# Patient Record
Sex: FEMALE | Race: WHITE | Employment: FULL TIME | URBAN - METROPOLITAN AREA
[De-identification: names, ages, dates, MRNs, and addresses within clinical notes are randomized per-mention and may not be internally consistent; named-entity substitution may affect disease eponyms.]

---

## 2018-04-18 ENCOUNTER — HOSPITAL ENCOUNTER (EMERGENCY)
Age: 53
Discharge: HOME | End: 2018-04-18
Attending: STUDENT IN AN ORGANIZED HEALTH CARE EDUCATION/TRAINING PROGRAM
Payer: COMMERCIAL

## 2018-04-18 VITALS
RESPIRATION RATE: 18 BRPM | OXYGEN SATURATION: 96 % | WEIGHT: 164 LBS | HEIGHT: 68 IN | DIASTOLIC BLOOD PRESSURE: 90 MMHG | BODY MASS INDEX: 24.86 KG/M2 | TEMPERATURE: 97.8 F | SYSTOLIC BLOOD PRESSURE: 146 MMHG | HEART RATE: 83 BPM

## 2018-04-18 DIAGNOSIS — H81.399 PERIPHERAL VERTIGO, UNSPECIFIED LATERALITY: Primary | ICD-10-CM

## 2018-04-18 LAB
GLUCOSE BLD STRIP.AUTO-MCNC: 93 MG/DL (ref 65–100)
SERVICE CMNT-IMP: NORMAL

## 2018-04-18 PROCEDURE — 74011250636 HC RX REV CODE- 250/636: Performed by: STUDENT IN AN ORGANIZED HEALTH CARE EDUCATION/TRAINING PROGRAM

## 2018-04-18 PROCEDURE — 82962 GLUCOSE BLOOD TEST: CPT

## 2018-04-18 PROCEDURE — 99283 EMERGENCY DEPT VISIT LOW MDM: CPT

## 2018-04-18 PROCEDURE — 74011250637 HC RX REV CODE- 250/637: Performed by: STUDENT IN AN ORGANIZED HEALTH CARE EDUCATION/TRAINING PROGRAM

## 2018-04-18 RX ORDER — DIAZEPAM 2 MG/1
2 TABLET ORAL
Status: COMPLETED | OUTPATIENT
Start: 2018-04-18 | End: 2018-04-18

## 2018-04-18 RX ORDER — MECLIZINE HCL 12.5 MG 12.5 MG/1
25 TABLET ORAL
Status: COMPLETED | OUTPATIENT
Start: 2018-04-18 | End: 2018-04-18

## 2018-04-18 RX ORDER — MECLIZINE HYDROCHLORIDE 25 MG/1
25 TABLET ORAL
Qty: 18 TAB | Refills: 0 | Status: SHIPPED | OUTPATIENT
Start: 2018-04-18 | End: 2021-04-06

## 2018-04-18 RX ADMIN — MECLIZINE 25 MG: 12.5 TABLET ORAL at 09:53

## 2018-04-18 RX ADMIN — DIAZEPAM 2 MG: 2 TABLET ORAL at 09:53

## 2018-04-18 NOTE — DISCHARGE INSTRUCTIONS
Vertigo: Care Instructions  Your Care Instructions    Vertigo is the feeling that you or your surroundings are moving when there is no actual movement. It is often described as a feeling of spinning, whirling, falling, or tilting. Vertigo may make you vomit or feel nauseated. You may have trouble standing or walking and may lose your balance. Vertigo is often related to an inner ear problem, but it can have other more serious causes. If vertigo continues, you may need more tests to find its cause. Follow-up care is a key part of your treatment and safety. Be sure to make and go to all appointments, and call your doctor if you are having problems. It's also a good idea to know your test results and keep a list of the medicines you take. How can you care for yourself at home? · Do not lie flat on your back. Prop yourself up slightly. This may reduce the spinning feeling. Keep your eyes open. · Move slowly so that you do not fall. · If your doctor recommends medicine, take it exactly as directed. · Do not drive while you are having vertigo. Certain exercises, called Munguia-Daroff exercises, can help decrease vertigo. To do Munguia-Daroff exercises:  · Sit on the edge of a bed or sofa and quickly lie down on the side that causes the worst vertigo. Lie on your side with your ear down. · Stay in this position for at least 30 seconds or until the vertigo goes away. · Sit up. If this causes vertigo, wait for it to stop. · Repeat the procedure on the other side. · Repeat this 10 times. Do these exercises 2 times a day until the vertigo is gone. When should you call for help? Call 911 anytime you think you may need emergency care. For example, call if:  ? · You passed out (lost consciousness). ? · You have symptoms of a stroke. These may include:  ¨ Sudden numbness, tingling, weakness, or loss of movement in your face, arm, or leg, especially on only one side of your body.   ¨ Sudden vision changes. ¨ Sudden trouble speaking. ¨ Sudden confusion or trouble understanding simple statements. ¨ Sudden problems with walking or balance. ¨ A sudden, severe headache that is different from past headaches. ?Call your doctor now or seek immediate medical care if:  ? · Vertigo occurs with a fever, a headache, or ringing in your ears. ? · You have new or increased nausea and vomiting. ? Watch closely for changes in your health, and be sure to contact your doctor if:  ? · Vertigo gets worse or happens more often. ? · Vertigo has not gotten better after 2 weeks. Where can you learn more? Go to http://annmarie-lu.info/. Enter O688 in the search box to learn more about \"Vertigo: Care Instructions. \"  Current as of: May 12, 2017  Content Version: 11.4  © 0996-5987 [employer]. Care instructions adapted under license by [employer] (which disclaims liability or warranty for this information). If you have questions about a medical condition or this instruction, always ask your healthcare professional. [place] [address] any warranty or liability for your use of this information.

## 2018-04-18 NOTE — ED PROVIDER NOTES
HPI Comments: 50 y.o. female with past medical history significant for vertigo, anxiety, acid reflux, and seasonal allergies who presents from home via private vehicle with chief complaint of dizziness. Pt reports that two nights ago she had onset of dizziness when laying in bed. This resolved after ~ 20 minutes when she sat up. Last night, however, she had onset of similar sx that have not resolved. She notes dizziness that is exacerbated byany movement (e.g. turning her head, stepping, and blowing her nose). She describes the dizziness as not so much room spinning but her vision lagging behind as if she had been drinking alcohol and feeling \"offkilter. \" She notes associated nausea, vomiting, and lightheadedness. Pt denies problems with speech or vision. No tinnitus. Pt notes that she had similar sx three years ago that resolved after 20 minutes and she was dx with vertigo. Pt was told by MD that this may have been caused by fluid due to her seasonal allergies, and pt notes that she has been taking Claritin and Flonase without having a similar problem until onset 2 days ago. Pt does not have any medications at home specifically for vertigo and has not taken any PTA. Pt denies CP, abdominal pain, and SOB. There are no other acute medical concerns at this time. PCP: Marleny Zuluaga MD    Note written by Nayan Vicente, as dictated by Leon Gillis MD 9:35 AM     The history is provided by the patient. No  was used. Past Medical History:   Diagnosis Date    Acid reflux     Allergy     Psychiatric disorder     anxiety    Vertigo        History reviewed. No pertinent surgical history. History reviewed. No pertinent family history. Social History     Social History    Marital status: UNKNOWN     Spouse name: N/A    Number of children: N/A    Years of education: N/A     Occupational History    Not on file.      Social History Main Topics    Smoking status: Former Smoker     Packs/day: 0.50     Years: 5.00     Quit date: 5/11/2001    Smokeless tobacco: Never Used    Alcohol use Yes    Drug use: No    Sexual activity: Not on file     Other Topics Concern    Not on file     Social History Narrative         ALLERGIES: Erythromycin; Keflex [cephalexin]; and Sulfa (sulfonamide antibiotics)    Review of Systems   Constitutional: Negative for chills and fever. HENT: Negative for sore throat and tinnitus. Eyes: Negative for visual disturbance. Respiratory: Negative for cough and shortness of breath. Cardiovascular: Negative for chest pain. Gastrointestinal: Positive for nausea and vomiting. Negative for abdominal pain. Genitourinary: Negative for dysuria. Musculoskeletal: Negative for back pain. Skin: Negative for rash. Neurological: Positive for dizziness and light-headedness. Negative for syncope, speech difficulty and headaches. Psychiatric/Behavioral: Negative for confusion. All other systems reviewed and are negative. Vitals:    04/18/18 0848   BP: 146/90   Pulse: 83   Resp: 18   Temp: 97.8 °F (36.6 °C)   SpO2: 96%   Weight: 74.4 kg (164 lb)   Height: 5' 7.5\" (1.715 m)            Physical Exam   Constitutional: She is oriented to person, place, and time. She appears well-developed. No distress. HENT:   Head: Normocephalic and atraumatic. TM's normal bilaterally. Positive mild nasal congestion. Eyes: Conjunctivae and EOM are normal. Pupils are equal, round, and reactive to light. Neck: Normal range of motion. Neck supple. Cardiovascular: Normal rate, regular rhythm and normal heart sounds. No murmur heard. Pulmonary/Chest: Effort normal and breath sounds normal. No respiratory distress. Abdominal: Soft. Bowel sounds are normal. She exhibits no distension. There is no tenderness. There is no rebound. Musculoskeletal: Normal range of motion. She exhibits no edema.    Neurological: She is alert and oriented to person, place, and time. She has normal strength. No cranial nerve deficit or sensory deficit. She exhibits normal muscle tone. Coordination normal. GCS eye subscore is 4. GCS verbal subscore is 5. GCS motor subscore is 6. No focal neurological deficits. Bilateral finger to nose normal.    Skin: Skin is warm and dry. No rash noted. Psychiatric: She has a normal mood and affect. Her behavior is normal.   Nursing note and vitals reviewed.      Note written by Nayan Nolan, as dictated by Jennifer Sweet MD 9:40 AM      [facility]      ED Course       Procedures

## 2018-04-30 PROBLEM — I10 ESSENTIAL HYPERTENSION: Status: ACTIVE | Noted: 2018-04-30

## 2021-03-25 ENCOUNTER — TRANSCRIBE ORDER (OUTPATIENT)
Dept: REGISTRATION | Age: 56
End: 2021-03-25

## 2021-03-25 DIAGNOSIS — Z01.812 PRE-PROCEDURE LAB EXAM: Primary | ICD-10-CM

## 2021-04-03 ENCOUNTER — HOSPITAL ENCOUNTER (OUTPATIENT)
Dept: PREADMISSION TESTING | Age: 56
Discharge: HOME | End: 2021-04-03
Payer: COMMERCIAL

## 2021-04-03 DIAGNOSIS — Z01.812 PRE-PROCEDURE LAB EXAM: ICD-10-CM

## 2021-04-03 PROCEDURE — U0003 INFECTIOUS AGENT DETECTION BY NUCLEIC ACID (DNA OR RNA); SEVERE ACUTE RESPIRATORY SYNDROME CORONAVIRUS 2 (SARS-COV-2) (CORONAVIRUS DISEASE [COVID-19]), AMPLIFIED PROBE TECHNIQUE, MAKING USE OF HIGH THROUGHPUT TECHNOLOGIES AS DESCRIBED BY CMS-2020-01-R: HCPCS

## 2021-04-04 LAB — SARS-COV-2, COV2NT: NOT DETECTED

## 2021-04-06 ENCOUNTER — ANESTHESIA EVENT (OUTPATIENT)
Dept: SURGERY | Age: 56
End: 2021-04-06
Payer: COMMERCIAL

## 2021-04-06 NOTE — PERIOP NOTES
MAYUR DENNIS, GAVE PATIENT PREOP INSTRUCTIONS VERBALLY OVER THE PHONE PER ANESTHESIA PROTOCOL, PATIENT VERBALIZED UNDERSTANDING.

## 2021-04-06 NOTE — H&P
Gynecology History and Physical    Name: Jennifer Yanez MRN: [id number] SSN: [id number]    YOB: 1965  Age: 50 y.o. Sex: female       Subjective:      Chief complaint:  Postmenopausal bleeding    Viki Burgos is a 50 y.o. female with a history of postmenopausal bleeding. Ultrasound done in November showed normal uterus with EMT 1.2 mm. She also had complex left ovarian cyst but normal . She continued to have bleeding. Endometrial sampling was unable to be done due to stenotic os. She is admitted for Procedure(s) (LRB):  HYSTEROSCOPY, MYOSURE, ENDOMETRAIL SAMPLING (N/A). OB History    No obstetric history on file. Past Medical History:   Diagnosis Date    Acid reflux     Allergy     Asthma     STRESS INDUCED ASTHMA    Essential hypertension 2018    Psychiatric disorder     anxiety    Vertigo      Past Surgical History:   Procedure Laterality Date    HX COLONOSCOPY      HX WISDOM TEETH EXTRACTION       Social History     Occupational History    Not on file   Tobacco Use    Smoking status: Former Smoker     Packs/day: 0.50     Years: 5.00     Pack years: 2.50     Quit date: 2001     Years since quittin.9    Smokeless tobacco: Never Used   Substance and Sexual Activity    Alcohol use: Yes    Drug use: Yes     Types: Marijuana     Comment: LAST USE OF EDIBLES WAS 3/25/21    Sexual activity: Not on file     Family History   Problem Relation Age of Onset    Stroke Mother     Diabetes Mother     Lupus Mother     Heart Disease Father     Other Father         HISTORY OF AFIB    Hypertension Father     Hypertension Brother     Anesth Problems Neg Hx         Allergies   Allergen Reactions    Erythromycin Unknown (comments)    Keflex [Cephalexin] Unknown (comments)    Sulfa (Sulfonamide Antibiotics) Rash     Prior to Admission medications    Medication Sig Start Date End Date Taking?  Authorizing Provider   lisinopriL (PRINIVIL, ZESTRIL) 40 mg tablet Take 1 Tab by mouth daily. 11/30/20  Yes Wu Hernández MD   sertraline (ZOLOFT) 50 mg tablet TAKE 2 TABLETS BY MOUTH DAILY 11/2/20  Yes Wu Hernández MD   albuterol (PROVENTIL HFA) 90 mcg/actuation inhaler Take  by inhalation. Yes Provider, Historical        Review of Systems:  A comprehensive review of systems was negative except for that written in the History of Present Illness. Objective:     Vitals:    04/06/21 1014   Weight: 78.6 kg (173 lb 5.6 oz)   Height: 5' 7\" (1.702 m)       Physical Exam:  Patient without distress. Heart: Regular rate and rhythm  Lung: clear to auscultation throughout lung fields, no wheezes, no rales, no rhonchi and normal respiratory effort  Abdomen: soft, nontender    Assessment:     Active Problems:    * No active hospital problems. *    49 y/o with persistent postmenopausal bleeding. Reviewed presentation, history and previous records. Discussed secondary to cervical stenosis noted on previous examinations, recommend proceeding with surgical intervention for both diagnostic and therapeutic benefit. Discussed recommendation for Hysteroscopy, Myosure endometrial sampling. Reviewed risks, benefits, expectations of procedure including but not limited to risks of bleeding, infection, anesthesia, injury to surrounding organs. Discussed RX for misoprostol will be sent for patient to take the evening prior to procedure. Patient expressed understanding. Plan:     Procedure(s) (LRB):  HYSTEROSCOPY, MYOSURE, ENDOMETRAIL SAMPLING (N/A)  Discussed the risks of surgery including the risks of bleeding, infection, deep vein thrombosis, and surgical injuries to internal organs including but not limited to the bowels, bladder, rectum, and female reproductive organs. The patient understands the risks; any and all questions were answered to the patient's satisfaction.

## 2021-04-07 ENCOUNTER — ANESTHESIA (OUTPATIENT)
Dept: SURGERY | Age: 56
End: 2021-04-07
Payer: COMMERCIAL

## 2021-04-07 ENCOUNTER — HOSPITAL ENCOUNTER (OUTPATIENT)
Age: 56
Discharge: HOME | End: 2021-04-07
Attending: OBSTETRICS & GYNECOLOGY | Admitting: OBSTETRICS & GYNECOLOGY
Payer: COMMERCIAL

## 2021-04-07 VITALS
HEART RATE: 80 BPM | HEIGHT: 67 IN | SYSTOLIC BLOOD PRESSURE: 130 MMHG | TEMPERATURE: 98.3 F | BODY MASS INDEX: 27.21 KG/M2 | DIASTOLIC BLOOD PRESSURE: 76 MMHG | RESPIRATION RATE: 16 BRPM | WEIGHT: 173.35 LBS | OXYGEN SATURATION: 99 %

## 2021-04-07 DIAGNOSIS — Z98.890 S/P LAPAROSCOPY: Primary | ICD-10-CM

## 2021-04-07 PROBLEM — N95.0 POSTMENOPAUSAL BLEEDING: Status: ACTIVE | Noted: 2021-04-07

## 2021-04-07 LAB — HCG UR QL: NEGATIVE

## 2021-04-07 PROCEDURE — 74011000250 HC RX REV CODE- 250: Performed by: NURSE ANESTHETIST, CERTIFIED REGISTERED

## 2021-04-07 PROCEDURE — 77030020829: Performed by: OBSTETRICS & GYNECOLOGY

## 2021-04-07 PROCEDURE — 77030040361 HC SLV COMPR DVT MDII -B: Performed by: OBSTETRICS & GYNECOLOGY

## 2021-04-07 PROCEDURE — 88305 TISSUE EXAM BY PATHOLOGIST: CPT

## 2021-04-07 PROCEDURE — 77030010507 HC ADH SKN DERMBND J&J -B: Performed by: OBSTETRICS & GYNECOLOGY

## 2021-04-07 PROCEDURE — 74011250636 HC RX REV CODE- 250/636: Performed by: NURSE ANESTHETIST, CERTIFIED REGISTERED

## 2021-04-07 PROCEDURE — 77030008756 HC TU IRR SUC STRY -B: Performed by: OBSTETRICS & GYNECOLOGY

## 2021-04-07 PROCEDURE — 77030002933 HC SUT MCRYL J&J -A: Performed by: OBSTETRICS & GYNECOLOGY

## 2021-04-07 PROCEDURE — 81025 URINE PREGNANCY TEST: CPT

## 2021-04-07 PROCEDURE — 74011250636 HC RX REV CODE- 250/636: Performed by: ANESTHESIOLOGY

## 2021-04-07 PROCEDURE — 77030019908 HC STETH ESOPH SIMS -A: Performed by: ANESTHESIOLOGY

## 2021-04-07 PROCEDURE — 2709999900 HC NON-CHARGEABLE SUPPLY: Performed by: OBSTETRICS & GYNECOLOGY

## 2021-04-07 PROCEDURE — 77030031139 HC SUT VCRL2 J&J -A: Performed by: OBSTETRICS & GYNECOLOGY

## 2021-04-07 PROCEDURE — 77030041423 HC SYST FLUID MNGMT FLUENT HOLO -D: Performed by: OBSTETRICS & GYNECOLOGY

## 2021-04-07 PROCEDURE — 77030040922 HC BLNKT HYPOTHRM STRY -A

## 2021-04-07 PROCEDURE — 77030013484: Performed by: OBSTETRICS & GYNECOLOGY

## 2021-04-07 PROCEDURE — 77030008684 HC TU ET CUF COVD -B: Performed by: ANESTHESIOLOGY

## 2021-04-07 PROCEDURE — 77030026438 HC STYL ET INTUB CARD -A: Performed by: ANESTHESIOLOGY

## 2021-04-07 PROCEDURE — 77030012770 HC TRCR OPT FX AMR -B: Performed by: OBSTETRICS & GYNECOLOGY

## 2021-04-07 PROCEDURE — 74011250637 HC RX REV CODE- 250/637: Performed by: ANESTHESIOLOGY

## 2021-04-07 PROCEDURE — 76010000131 HC OR TIME 2 TO 2.5 HR: Performed by: OBSTETRICS & GYNECOLOGY

## 2021-04-07 PROCEDURE — 77030013079 HC BLNKT BAIR HGGR 3M -A: Performed by: ANESTHESIOLOGY

## 2021-04-07 PROCEDURE — 76210000006 HC OR PH I REC 0.5 TO 1 HR: Performed by: OBSTETRICS & GYNECOLOGY

## 2021-04-07 PROCEDURE — 77030019905 HC CATH URETH INTMIT MDII -A: Performed by: OBSTETRICS & GYNECOLOGY

## 2021-04-07 PROCEDURE — 77030020263 HC SOL INJ SOD CL0.9% LFCR 1000ML: Performed by: OBSTETRICS & GYNECOLOGY

## 2021-04-07 PROCEDURE — 76060000035 HC ANESTHESIA 2 TO 2.5 HR: Performed by: OBSTETRICS & GYNECOLOGY

## 2021-04-07 PROCEDURE — 77030040830 HC CATH URETH FOL MDII -A: Performed by: OBSTETRICS & GYNECOLOGY

## 2021-04-07 PROCEDURE — 76210000020 HC REC RM PH II FIRST 0.5 HR: Performed by: OBSTETRICS & GYNECOLOGY

## 2021-04-07 PROCEDURE — 74011000250 HC RX REV CODE- 250: Performed by: OBSTETRICS & GYNECOLOGY

## 2021-04-07 RX ORDER — SODIUM CHLORIDE, SODIUM LACTATE, POTASSIUM CHLORIDE, CALCIUM CHLORIDE 600; 310; 30; 20 MG/100ML; MG/100ML; MG/100ML; MG/100ML
INJECTION, SOLUTION INTRAVENOUS
Status: DISCONTINUED | OUTPATIENT
Start: 2021-04-07 | End: 2021-04-07 | Stop reason: HOSPADM

## 2021-04-07 RX ORDER — ONDANSETRON 2 MG/ML
4 INJECTION INTRAMUSCULAR; INTRAVENOUS AS NEEDED
Status: DISCONTINUED | OUTPATIENT
Start: 2021-04-07 | End: 2021-04-07 | Stop reason: HOSPADM

## 2021-04-07 RX ORDER — FENTANYL CITRATE 50 UG/ML
25 INJECTION, SOLUTION INTRAMUSCULAR; INTRAVENOUS
Status: DISCONTINUED | OUTPATIENT
Start: 2021-04-07 | End: 2021-04-07 | Stop reason: HOSPADM

## 2021-04-07 RX ORDER — HYDROMORPHONE HYDROCHLORIDE 2 MG/ML
INJECTION, SOLUTION INTRAMUSCULAR; INTRAVENOUS; SUBCUTANEOUS AS NEEDED
Status: DISCONTINUED | OUTPATIENT
Start: 2021-04-07 | End: 2021-04-07 | Stop reason: HOSPADM

## 2021-04-07 RX ORDER — SODIUM CHLORIDE 0.9 % (FLUSH) 0.9 %
5-40 SYRINGE (ML) INJECTION EVERY 8 HOURS
Status: DISCONTINUED | OUTPATIENT
Start: 2021-04-07 | End: 2021-04-07 | Stop reason: HOSPADM

## 2021-04-07 RX ORDER — OXYCODONE AND ACETAMINOPHEN 5; 325 MG/1; MG/1
1 TABLET ORAL AS NEEDED
Status: DISCONTINUED | OUTPATIENT
Start: 2021-04-07 | End: 2021-04-07 | Stop reason: HOSPADM

## 2021-04-07 RX ORDER — SODIUM CHLORIDE, SODIUM LACTATE, POTASSIUM CHLORIDE, CALCIUM CHLORIDE 600; 310; 30; 20 MG/100ML; MG/100ML; MG/100ML; MG/100ML
125 INJECTION, SOLUTION INTRAVENOUS CONTINUOUS
Status: DISCONTINUED | OUTPATIENT
Start: 2021-04-07 | End: 2021-04-07 | Stop reason: HOSPADM

## 2021-04-07 RX ORDER — SODIUM CHLORIDE 0.9 % (FLUSH) 0.9 %
5-40 SYRINGE (ML) INJECTION AS NEEDED
Status: DISCONTINUED | OUTPATIENT
Start: 2021-04-07 | End: 2021-04-07 | Stop reason: HOSPADM

## 2021-04-07 RX ORDER — DEXAMETHASONE SODIUM PHOSPHATE 4 MG/ML
INJECTION, SOLUTION INTRA-ARTICULAR; INTRALESIONAL; INTRAMUSCULAR; INTRAVENOUS; SOFT TISSUE AS NEEDED
Status: DISCONTINUED | OUTPATIENT
Start: 2021-04-07 | End: 2021-04-07 | Stop reason: HOSPADM

## 2021-04-07 RX ORDER — CEFAZOLIN SODIUM 1 G/3ML
INJECTION, POWDER, FOR SOLUTION INTRAMUSCULAR; INTRAVENOUS AS NEEDED
Status: DISCONTINUED | OUTPATIENT
Start: 2021-04-07 | End: 2021-04-07 | Stop reason: HOSPADM

## 2021-04-07 RX ORDER — SILVER NITRATE 38.21; 12.74 MG/1; MG/1
STICK TOPICAL AS NEEDED
Status: DISCONTINUED | OUTPATIENT
Start: 2021-04-07 | End: 2021-04-07 | Stop reason: HOSPADM

## 2021-04-07 RX ORDER — LIDOCAINE HYDROCHLORIDE 10 MG/ML
0.1 INJECTION, SOLUTION EPIDURAL; INFILTRATION; INTRACAUDAL; PERINEURAL AS NEEDED
Status: DISCONTINUED | OUTPATIENT
Start: 2021-04-07 | End: 2021-04-07 | Stop reason: HOSPADM

## 2021-04-07 RX ORDER — MIDAZOLAM HYDROCHLORIDE 1 MG/ML
INJECTION, SOLUTION INTRAMUSCULAR; INTRAVENOUS AS NEEDED
Status: DISCONTINUED | OUTPATIENT
Start: 2021-04-07 | End: 2021-04-07 | Stop reason: HOSPADM

## 2021-04-07 RX ORDER — OXYCODONE AND ACETAMINOPHEN 5; 325 MG/1; MG/1
1 TABLET ORAL
Qty: 20 TAB | Refills: 0 | Status: SHIPPED | OUTPATIENT
Start: 2021-04-07 | End: 2021-04-12

## 2021-04-07 RX ORDER — MIDAZOLAM HYDROCHLORIDE 1 MG/ML
1 INJECTION, SOLUTION INTRAMUSCULAR; INTRAVENOUS AS NEEDED
Status: DISCONTINUED | OUTPATIENT
Start: 2021-04-07 | End: 2021-04-07 | Stop reason: HOSPADM

## 2021-04-07 RX ORDER — FENTANYL CITRATE 50 UG/ML
INJECTION, SOLUTION INTRAMUSCULAR; INTRAVENOUS AS NEEDED
Status: DISCONTINUED | OUTPATIENT
Start: 2021-04-07 | End: 2021-04-07 | Stop reason: HOSPADM

## 2021-04-07 RX ORDER — ROCURONIUM BROMIDE 10 MG/ML
INJECTION, SOLUTION INTRAVENOUS AS NEEDED
Status: DISCONTINUED | OUTPATIENT
Start: 2021-04-07 | End: 2021-04-07 | Stop reason: HOSPADM

## 2021-04-07 RX ORDER — ONDANSETRON 2 MG/ML
INJECTION INTRAMUSCULAR; INTRAVENOUS AS NEEDED
Status: DISCONTINUED | OUTPATIENT
Start: 2021-04-07 | End: 2021-04-07 | Stop reason: HOSPADM

## 2021-04-07 RX ORDER — PROPOFOL 10 MG/ML
INJECTION, EMULSION INTRAVENOUS AS NEEDED
Status: DISCONTINUED | OUTPATIENT
Start: 2021-04-07 | End: 2021-04-07 | Stop reason: HOSPADM

## 2021-04-07 RX ORDER — MIDAZOLAM HYDROCHLORIDE 1 MG/ML
0.5 INJECTION, SOLUTION INTRAMUSCULAR; INTRAVENOUS
Status: DISCONTINUED | OUTPATIENT
Start: 2021-04-07 | End: 2021-04-07 | Stop reason: HOSPADM

## 2021-04-07 RX ORDER — DIPHENHYDRAMINE HYDROCHLORIDE 50 MG/ML
12.5 INJECTION, SOLUTION INTRAMUSCULAR; INTRAVENOUS AS NEEDED
Status: DISCONTINUED | OUTPATIENT
Start: 2021-04-07 | End: 2021-04-07 | Stop reason: HOSPADM

## 2021-04-07 RX ORDER — FENTANYL CITRATE 50 UG/ML
50 INJECTION, SOLUTION INTRAMUSCULAR; INTRAVENOUS AS NEEDED
Status: DISCONTINUED | OUTPATIENT
Start: 2021-04-07 | End: 2021-04-07 | Stop reason: HOSPADM

## 2021-04-07 RX ORDER — KETAMINE HYDROCHLORIDE 10 MG/ML
INJECTION, SOLUTION INTRAMUSCULAR; INTRAVENOUS AS NEEDED
Status: DISCONTINUED | OUTPATIENT
Start: 2021-04-07 | End: 2021-04-07 | Stop reason: HOSPADM

## 2021-04-07 RX ORDER — SUCCINYLCHOLINE CHLORIDE 20 MG/ML
INJECTION INTRAMUSCULAR; INTRAVENOUS AS NEEDED
Status: DISCONTINUED | OUTPATIENT
Start: 2021-04-07 | End: 2021-04-07 | Stop reason: HOSPADM

## 2021-04-07 RX ORDER — MORPHINE SULFATE 2 MG/ML
2 INJECTION, SOLUTION INTRAMUSCULAR; INTRAVENOUS
Status: DISCONTINUED | OUTPATIENT
Start: 2021-04-07 | End: 2021-04-07 | Stop reason: HOSPADM

## 2021-04-07 RX ORDER — GLYCOPYRROLATE 0.2 MG/ML
INJECTION INTRAMUSCULAR; INTRAVENOUS AS NEEDED
Status: DISCONTINUED | OUTPATIENT
Start: 2021-04-07 | End: 2021-04-07 | Stop reason: HOSPADM

## 2021-04-07 RX ORDER — ACETAMINOPHEN 325 MG/1
650 TABLET ORAL ONCE
Status: COMPLETED | OUTPATIENT
Start: 2021-04-07 | End: 2021-04-07

## 2021-04-07 RX ORDER — HYDROMORPHONE HYDROCHLORIDE 1 MG/ML
0.2 INJECTION, SOLUTION INTRAMUSCULAR; INTRAVENOUS; SUBCUTANEOUS
Status: DISCONTINUED | OUTPATIENT
Start: 2021-04-07 | End: 2021-04-07 | Stop reason: HOSPADM

## 2021-04-07 RX ORDER — IBUPROFEN 800 MG/1
800 TABLET ORAL
Qty: 30 TAB | Refills: 0 | Status: SHIPPED | OUTPATIENT
Start: 2021-04-07

## 2021-04-07 RX ORDER — LIDOCAINE HYDROCHLORIDE 20 MG/ML
INJECTION, SOLUTION EPIDURAL; INFILTRATION; INTRACAUDAL; PERINEURAL AS NEEDED
Status: DISCONTINUED | OUTPATIENT
Start: 2021-04-07 | End: 2021-04-07 | Stop reason: HOSPADM

## 2021-04-07 RX ORDER — NEOSTIGMINE METHYLSULFATE 1 MG/ML
INJECTION INTRAVENOUS AS NEEDED
Status: DISCONTINUED | OUTPATIENT
Start: 2021-04-07 | End: 2021-04-07 | Stop reason: HOSPADM

## 2021-04-07 RX ORDER — SODIUM CHLORIDE 9 MG/ML
25 INJECTION, SOLUTION INTRAVENOUS CONTINUOUS
Status: DISCONTINUED | OUTPATIENT
Start: 2021-04-07 | End: 2021-04-07 | Stop reason: HOSPADM

## 2021-04-07 RX ORDER — BUPIVACAINE HYDROCHLORIDE 2.5 MG/ML
INJECTION, SOLUTION EPIDURAL; INFILTRATION; INTRACAUDAL AS NEEDED
Status: DISCONTINUED | OUTPATIENT
Start: 2021-04-07 | End: 2021-04-07 | Stop reason: HOSPADM

## 2021-04-07 RX ADMIN — HYDROMORPHONE HYDROCHLORIDE 0.25 MG: 2 INJECTION, SOLUTION INTRAMUSCULAR; INTRAVENOUS; SUBCUTANEOUS at 11:16

## 2021-04-07 RX ADMIN — PROPOFOL 25 MG: 10 INJECTION, EMULSION INTRAVENOUS at 10:22

## 2021-04-07 RX ADMIN — ACETAMINOPHEN 650 MG: 325 TABLET ORAL at 08:33

## 2021-04-07 RX ADMIN — GLYCOPYRROLATE 0.4 MG: 0.2 INJECTION, SOLUTION INTRAMUSCULAR; INTRAVENOUS at 11:02

## 2021-04-07 RX ADMIN — FENTANYL CITRATE 50 MCG: 50 INJECTION, SOLUTION INTRAMUSCULAR; INTRAVENOUS at 09:36

## 2021-04-07 RX ADMIN — SODIUM CHLORIDE, POTASSIUM CHLORIDE, SODIUM LACTATE AND CALCIUM CHLORIDE 125 ML/HR: 600; 310; 30; 20 INJECTION, SOLUTION INTRAVENOUS at 08:48

## 2021-04-07 RX ADMIN — MIDAZOLAM HYDROCHLORIDE 2 MG: 1 INJECTION, SOLUTION INTRAMUSCULAR; INTRAVENOUS at 09:30

## 2021-04-07 RX ADMIN — ONDANSETRON HYDROCHLORIDE 4 MG: 2 INJECTION, SOLUTION INTRAMUSCULAR; INTRAVENOUS at 09:42

## 2021-04-07 RX ADMIN — PROPOFOL 200 MG: 10 INJECTION, EMULSION INTRAVENOUS at 09:36

## 2021-04-07 RX ADMIN — SODIUM CHLORIDE, POTASSIUM CHLORIDE, SODIUM LACTATE AND CALCIUM CHLORIDE: 600; 310; 30; 20 INJECTION, SOLUTION INTRAVENOUS at 09:23

## 2021-04-07 RX ADMIN — MIDAZOLAM HYDROCHLORIDE 3 MG: 1 INJECTION, SOLUTION INTRAMUSCULAR; INTRAVENOUS at 09:27

## 2021-04-07 RX ADMIN — KETAMINE HYDROCHLORIDE 25 MG: 10 INJECTION, SOLUTION INTRAMUSCULAR; INTRAVENOUS at 10:22

## 2021-04-07 RX ADMIN — ROCURONIUM BROMIDE 25 MG: 10 SOLUTION INTRAVENOUS at 10:25

## 2021-04-07 RX ADMIN — SUCCINYLCHOLINE CHLORIDE 160 MG: 20 INJECTION, SOLUTION INTRAMUSCULAR; INTRAVENOUS at 10:22

## 2021-04-07 RX ADMIN — PROPOFOL 200 MG: 10 INJECTION, EMULSION INTRAVENOUS at 09:35

## 2021-04-07 RX ADMIN — FENTANYL CITRATE 50 MCG: 50 INJECTION, SOLUTION INTRAMUSCULAR; INTRAVENOUS at 09:52

## 2021-04-07 RX ADMIN — KETAMINE HYDROCHLORIDE 25 MG: 10 INJECTION, SOLUTION INTRAMUSCULAR; INTRAVENOUS at 09:36

## 2021-04-07 RX ADMIN — CEFAZOLIN 2 G: 330 INJECTION, POWDER, FOR SOLUTION INTRAMUSCULAR; INTRAVENOUS at 10:26

## 2021-04-07 RX ADMIN — LIDOCAINE HYDROCHLORIDE 80 MG: 20 INJECTION, SOLUTION EPIDURAL; INFILTRATION; INTRACAUDAL; PERINEURAL at 09:35

## 2021-04-07 RX ADMIN — DEXAMETHASONE SODIUM PHOSPHATE 8 MG: 4 INJECTION, SOLUTION INTRAMUSCULAR; INTRAVENOUS at 09:42

## 2021-04-07 RX ADMIN — NEOSTIGMINE METHYLSULFATE 3 MG: 1 INJECTION, SOLUTION INTRAVENOUS at 11:02

## 2021-04-07 NOTE — INTERVAL H&P NOTE
Update History & Physical 
 
The Patient's History and Physical was reviewed with the patient and I examined the patient. There was no change. The surgical site was confirmed by the patient and me. Plan:  The risk, benefits, expected outcome, and alternative to the recommended procedure have been discussed with the patient. Patient understands and wants to proceed with the procedure. To OR for hysteroscopy, myosure endometrial sampling due to postmenopausal bleeding.   
 
Electronically signed by Frederic Layne MD on 4/7/2021 at 9:23 AM

## 2021-04-07 NOTE — DISCHARGE INSTRUCTIONS
Patient Education        Operative Hysteroscopy: What to Expect at Home  Your Recovery     An operative hysteroscopy is a procedure to find and treat problems with your uterus. It may have been done to remove growths from the uterus. Or it may have been done to treat fertility problems or abnormal bleeding. You may have cramps and vaginal bleeding for several days. If the doctor filled your uterus with air during the procedure, you may have gas pains, a feeling of fullness in your belly, or shoulder pain. These symptoms usually go away in 1 or 2 days. If the doctor filled your uterus with liquid during the procedure, you may have watery vaginal discharge for a few days. Many women are able to return to work on the day after the procedure. But it depends on what was done during the procedure and the type of work you do. This care sheet gives you a general idea about how long it will take for you to recover. But each person recovers at a different pace. Follow the steps below to get better as quickly as possible. How can you care for yourself at home? Activity    · Rest when you feel tired. Getting enough sleep will help you recover.     · Most women are able to return to work on the day after the procedure.     · You may shower and take baths as usual.     · Ask your doctor when it is okay for you to have sex.     · Talk about birth control with your doctor. Do not try to become pregnant until your doctor says it is okay. Diet    · You can eat your normal diet. If your stomach is upset, try bland, low-fat foods like plain rice, broiled chicken, toast, and yogurt.     · You may notice that your bowel movements are not regular right after the procedure. This is common. Try to avoid constipation and straining with bowel movements. You may want to take a fiber supplement every day. If you have not had a bowel movement after a couple of days, ask your doctor about taking a mild laxative.    Medicines    · Your doctor will tell you if and when you can restart your medicines. He or she will also give you instructions about taking any new medicines.     · If you take aspirin or some other blood thinner, ask your doctor if and when to start taking it again. Make sure that you understand exactly what your doctor wants you to do.     · Be safe with medicines. Take pain medicines exactly as directed. ? If the doctor gave you a prescription medicine for pain, take it as prescribed. ? If you are not taking a prescription pain medicine, ask your doctor if you can take an over-the-counter medicine. ? Do not take two or more pain medicines at the same time unless the doctor told you to. Many pain medicines have acetaminophen, which is Tylenol. Too much acetaminophen (Tylenol) can be harmful.     · If you think your pain medicine is making you sick to your stomach:  ? Take your medicine after meals (unless your doctor has told you not to). ? Ask your doctor for a different pain medicine.     · If your doctor prescribed antibiotics, take them as directed. Do not stop taking them just because you feel better. You need to take the full course of antibiotics. Other instructions    · You may have some light vaginal bleeding. Wear sanitary pads if needed. Do not douche or use tampons until your doctor says it is okay.     · You may want to use a heating pad on your belly to help with pain. Use a low heat setting. Follow-up care is a key part of your treatment and safety. Be sure to make and go to all appointments, and call your doctor if you are having problems. It's also a good idea to know your test results and keep a list of the medicines you take. When should you call for help? Call 911 anytime you think you may need emergency care. For example, call if:    · You pass out (lose consciousness).     · You have chest pain, are short of breath, or cough up blood.    Call your doctor now or seek immediate medical care if:    · You have bright red vaginal bleeding that soaks one or more pads in an hour, or you have large clots.     · You have vaginal discharge that has increased in amount or smells bad.     · You are sick to your stomach or cannot drink fluids.     · You have pain that does not get better after you take pain medicine.     · You have signs of infection, such as:  ? Increased pain, swelling, warmth, or redness. ? A fever.     · You cannot pass stools or gas.     · You have signs of a blood clot in your leg (called a deep vein thrombosis), such as:  ? Pain in your calf, back of the knee, thigh, or groin. ? Redness and swelling in your leg. Watch closely for changes in your health, and be sure to contact your doctor if you have any problems. Where can you learn more? Go to http://www.gray.com/  Enter S669 in the search box to learn more about \"Operative Hysteroscopy: What to Expect at Home. \"  Current as of: July 17, 2020               Content Version: 12.8  © 2006-2021 [employer]. Care instructions adapted under license by [employer] (which disclaims liability or warranty for this information). If you have questions about a medical condition or this instruction, always ask your healthcare professional. [place] [address] any warranty or liability for your use of this information. Patient Education     Patient Education        Vaginal Bleeding After Menopause: Care Instructions  Your Care Instructions     Vaginal bleeding after menopause can have many causes. Causes may include infection, inflammation, prescription hormones, abnormal growths, and injury. Your doctor may want you to have more tests to find the cause of your vaginal bleeding. Follow-up care is a key part of your treatment and safety. Be sure to make and go to all appointments, and call your doctor if you are having problems.  It's also a good idea to know your test results and keep a list of the medicines you take. How can you care for yourself at home? · If your doctor gave you medicine, take it exactly as prescribed. Call your doctor if you think you are having a problem with your medicine. · Do not have sex or put anything inside your vagina until you talk with your doctor. · Do not douche. When should you call for help? Call 911 anytime you think you may need emergency care. For example, call if:    · You passed out (lost consciousness). Call your doctor now or seek immediate medical care if:    · You have severe vaginal bleeding.     · You are dizzy or lightheaded, or you feel like you may faint.     · You have new or worse belly or pelvic pain. Watch closely for changes in your health, and be sure to contact your doctor if:    · Your bleeding gets worse.     · You think you might be pregnant.     · You do not get better as expected. Where can you learn more? Go to http://annmarie-lu.info/  Enter N304 in the search box to learn more about \"Vaginal Bleeding After Menopause: Care Instructions. \"  Current as of: July 17, 2020               Content Version: 12.8  © 9306-0392 [employer]. Care instructions adapted under license by [employer] (which disclaims liability or warranty for this information). If you have questions about a medical condition or this instruction, always ask your healthcare professional. [address] any warranty or liability for your use of this information. Pelvic Laparoscopy: What to Expect at [facility]     After surgery, it's normal to have a sore belly, cramping, or pain around the cuts the doctor made (incisions) for up to 4 days. You can expect to feel better and stronger each day. But you might get tired quickly and need pain medicine for a few days. Some people are able to return to work the day after surgery.  Others need to recover for a few days to a few weeks before going back to work. Sometimes pressure from the gas used during surgery causes other side effects. You may have pain in your neck or shoulders. Or you may feel pressure on your bladder and need to urinate more often than usual. These side effects should go away in less than 4 days. Do not lift anything heavy while you are recovering so that your incisions can heal.  This care sheet gives you a general idea about how long it will take for you to recover. But each person recovers at a different pace. Follow the steps below to get better as quickly as possible. How can you care for yourself at home? Activity    · Rest when you feel tired. Getting enough sleep will help you recover.     · Try to walk each day. Start out by walking a little more than you did the day before. Bit by bit, increase the amount you walk. Walking boosts blood flow and helps prevent pneumonia and constipation.     · For 1 week, avoid lifting anything that would make you strain. This may include a child, heavy grocery bags and milk containers, a heavy briefcase or backpack, cat litter or dog food bags, or a vacuum .     · Avoid strenuous activities, such as biking, jogging, weight lifting, and aerobic exercise, for 1 week.     · You may shower. Pat the incisions dry when you are done. Do not take a bath for the first week after surgery or until your doctor tells you it is okay.     · You may have some light vaginal bleeding. Wear sanitary pads if needed. Do not douche or use tampons.     · You may drive when you are no longer taking prescription pain medicine and can quickly move your foot from the gas pedal to the brake. You must also be able to sit comfortably for a long period of time, even if you do not plan to go far. You might get caught in traffic.     · You may need to take a few days to a few weeks off work.  It depends on the type of work you do and how you feel.     · Do not have sex until your doctor tells you it is okay.   Diet    · You can eat your normal diet. If your stomach is upset, try bland, low-fat foods like plain rice, broiled chicken, toast, and yogurt.     · Drink plenty of fluids (unless your doctor tells you not to).     · You may notice that your bowel movements are not regular right after your surgery. This is common. Try to avoid constipation and straining with bowel movements. You may want to take a fiber supplement every day. If you have not had a bowel movement after a couple of days, ask your doctor about taking a mild laxative. Medicines    · Your doctor will tell you if and when you can restart your medicines. He or she will also give you instructions about taking any new medicines.     · If you take aspirin or some other blood thinner, ask your doctor if and when to start taking it again. Make sure that you understand exactly what your doctor wants you to do.     · Be safe with medicines. Take pain medicines exactly as directed. ? If the doctor gave you a prescription medicine for pain, take it as prescribed. ? If you are not taking a prescription pain medicine, take an over-the-counter medicine such as acetaminophen (Tylenol), ibuprofen (Advil, Motrin), or naproxen (Aleve). Read and follow all instructions on the label. ? Do not take two or more pain medicines at the same time unless the doctor told you to. Many pain medicines contain acetaminophen, which is Tylenol. Too much acetaminophen (Tylenol) can be harmful.     · If you think your pain medicine is making you sick to your stomach:  ? Take your medicine after meals (unless your doctor tells you not to). ? Ask your doctor for a different pain medicine.     · If your doctor prescribed antibiotics, take them as directed. Do not stop taking them just because you feel better. You need to take the full course of antibiotics.    Incision care    · If you have strips of tape on the incisions, leave the tape on for a week or until it falls off.     · Wash the area daily with warm, soapy water and pat it dry.     · Keep the area clean and dry. You may cover it with a gauze bandage if it weeps or rubs against clothing. Change the bandage every day. Other instructions    · Wear loose, comfortable clothing, and avoid anything that puts pressure on your belly, such as a girdle, for a few weeks.     · You may want to use a heating pad on your belly to help with pain. Follow-up care is a key part of your treatment and safety. Be sure to make and go to all appointments, and call your doctor if you are having problems. It's also a good idea to know your test results and keep a list of the medicines you take. When should you call for help? Call 911 anytime you think you may need emergency care. For example, call if:    · You passed out (lost consciousness).     · You have chest pain, are short of breath, or cough up blood. Call your doctor now or seek immediate medical care if:    · You have bright red vaginal bleeding that soaks one or more pads in an hour, or you have large clots.     · You are sick to your stomach or cannot drink fluids.     · You have vaginal discharge that has increased in amount or smells bad.     · You have pain that does not get better after you take pain medicine.     · You have signs of infection, such as:  ? Increased pain, swelling, warmth, or redness. ? Red streaks leading from the incision. ? Pus draining from the incision. ? A fever.     · You have loose stitches, or your incisions come open.     · Bright red blood has soaked through the bandages over your incisions.     · You have signs of a blood clot in your leg (called a deep vein thrombosis), such as:  ? Pain in your calf, back of the knee, thigh, or groin. ? Redness and swelling in your leg.     · You cannot pass stools or gas. Watch closely for changes in your health, and be sure to contact your doctor if you have any problems. Where can you learn more?   Go to http://www.gray.com/  Enter X720 in the search box to learn more about \"Pelvic Laparoscopy: What to Expect at Home. \"  Current as of: July 17, 2020               Content Version: 12.8  © 2006-2021 [employer]. Care instructions adapted under license by [employer] (which disclaims liability or warranty for this information). If you have questions about a medical condition or this instruction, always ask your healthcare professional. [address] any warranty or liability for your use of this information. ______________________________________________________________________    Anesthesia Discharge Instructions    After general anesthesia or intervenous sedation, for 24 hours or while taking prescription Narcotics:  · Limit your activities  · Do not drive or operate hazardous machinery  · If you have not urinated within 8 hours after discharge, please contact your surgeon on call. · Do not make important personal or business decisions  · Do not drink alcoholic beverages    Report the following to your surgeon:  · Excessive pain, swelling, redness or odor of or around the surgical area  · Temperature over 100.5 degrees  · Nausea and vomiting lasting longer than 4 hours or if unable to take medication  · Any signs of decreased circulation or nerve impairment to extremity:  Change in color, persistent numbness, tingling, coldness or increased pain.   · Any questions

## 2021-04-07 NOTE — PERIOP NOTES
PHONE [address] [facility], [place] FROM PT'S , GEO, WITH DR. Mavis Greenberg AND THIS RN. PAPER CONSENT UPDATED.

## 2021-04-07 NOTE — ANESTHESIA PREPROCEDURE EVALUATION
Relevant Problems   CARDIOVASCULAR   (+) Essential hypertension       Anesthetic History   No history of anesthetic complications            Review of Systems / Medical History  Patient summary reviewed, nursing notes reviewed and pertinent labs reviewed    Pulmonary            Asthma : well controlled       Neuro/Psych   Within defined limits           Cardiovascular  Within defined limits  Hypertension              Exercise tolerance: >4 METS     GI/Hepatic/Renal  Within defined limits              Endo/Other  Within defined limits           Other Findings              Physical Exam    Airway  Mallampati: II  TM Distance: > 6 cm  Neck ROM: normal range of motion   Mouth opening: Normal     Cardiovascular  Regular rate and rhythm,  S1 and S2 normal,  no murmur, click, rub, or gallop             Dental  No notable dental hx       Pulmonary  Breath sounds clear to auscultation               Abdominal  GI exam deferred       Other Findings            Anesthetic Plan    ASA: 2  Anesthesia type: general          Induction: Intravenous  Anesthetic plan and risks discussed with: Patient

## 2021-04-07 NOTE — BRIEF OP NOTE
Brief Postoperative Note    Patient: Ken Wihte  YOB: 1965  MRN: [id number]    Date of Procedure: 4/7/2021     Pre-Op Diagnosis: POSTMENOPAUSAL BLEEDING, CERVICAL STENOSIS    Post-Op Diagnosis: Same     Procedure(s): HYSTEROSCOPY, DILITATION AND CURETTAGE, ENDOMETRIAL SAMPLING, DIAGNOSTIC LAPAROSCOPY    Surgeon(s):  Iza Cates MD    Surgical Assistant: Surg Asst-1: Burr Najjar    Anesthesia: General     Estimated Blood Loss (mL): 20 cc    Complications: uterine perforation    Specimens:   ID Type Source Tests Collected by Time Destination   1 : ENDOMETRIAL SAMPLING Fresh Uterus  Iza Cates MD 4/7/2021 1058 Pathology        Implants: * No implants in log *    Drains: * No LDAs found *    Findings:  Severely stenotic cervix but no lesions. Anteverted uterus approximately 7 week size on bimanual exam. Suspected uterine perforation so diagnostic laparoscopy performed. 1-2 mm posterior perforation noted upon laparoscopy but was hemostatic with pressure. Normal ovaries and fallopian tubes bilaterally.      Electronically Signed by Kerri Jimenez MD on 4/7/2021 at 11:11 AM

## 2021-04-07 NOTE — OP NOTES
Patient ID:     Name: Lew Noonan    Medical Record Number: [id number]    YOB: 1965    April 7, 2021      Preoperative Diagnosis:   POSTMENOPAUSAL BLEEDING, CERVICAL STENOSIS    Postoperative Diagnosis: POSTMENOPAUSAL BLEEDING, CERVICAL STENOSIS    Surgeon: Eneida Clancy MD    Assistant: Wayne Leach    Anesthesia: General, 0.25% marcaine    Procedure:  Hysteroscopy, Diagnostic laparoscopy    Estimated Blood Loss: 20 cc     Urine output: 300 cc total of clear yellow urine    Fluid deficit: 250 cc    IVF: 900 cc LR    Pathology /Specimens: endometrial biopsy    Complications: uterine perforation     Findings: Severely stenotic cervix but no lesions. Anteverted uterus approximately 6 week size on bimanual exam. Suspected uterine perforation so diagnostic laparoscopy performed. 1-2 mm posterior perforation noted upon laparoscopy but was hemostatic with pressure. Normal ovaries and fallopian tubes bilaterally. Signed By: Eneida Clancy MD        Procedure in Detail:  The patient was taken to the operating room where she was placed in the supine position. After undergoing adequate monitored anesthesia care she was placed up in the [facility] laparoscopy stirrups in the dorsal lithotomy position. The patient was then prepped and draped in the usual fashion straight cath was performed with return of 200 cc of clear yellow urine. Attention was first turned to the vagina where the speculum was placed in the vagina. The anterior lip of the cervix was grasped with a single-toothed tenaculum. Cervix was noted to be severely stenotic. Lacrimal duct probe, urethral dilators, cervical dilators were attempted but were unable to pass through external os. Cruciate incision was made on cervix. 3.75 mm hysteroscope was then used for hydrodistention. Under direct visualization it was difficult to determine if the cervical canal or false tract were visualized.  5mm scope was then attempted to be passed and fluid deficit was noted to increase to 250 quickly so procedure was aborted. Endometrial pipelle was then gently inserted into the cervix to assess cavity and at that time uterine perforation was suspected as uterus sounded to 10 cm. Patient was noted to be stable however given suspected perforation at that time, patient's  was called and consent to perform diagnostic laparoscopy was obtained. General endotracheal anesthesia was established and patient was then reprepped and draped. 2 g of ancef was administered. Pate was placed in the bladder. Spongestick was placed in the vagina. All other instruments were removed from the vagina and 's gloves were changed. Attention was then turned to the abdomen. 1mL of 0.25% marcaine was injected into the umbilicus. A vertical incision was made in the umbilicus and the 5mm 0 degree scope was placed into the 5 mm trochar. The skin was gripped bilaterally and elevated. The trochar was then advanced under direct visualization and intraperitoneal placement was confirmed. The abdomen was insulfflated without difficulty with CO2 gas. No evidence of bowel or other injury was noted. Small amount of pink tinged fluid was noted in the pelvis. Uterus was normal appearing along with normal ovaries and fallopian tubes bilaterally. Following this a 5mm port was placed without difficulty in the left lower quadrant after the area was infiltrated with marcaine to aid in visualization of posterior aspect of the uterus. A 1-2  mm perforation was noted posteriorly. It was slighly oozing so pressure was held for 1 minute. The site was then observed for 5 minutes with no brisk bleeding noted. Intraabdominal pressure was reduced and site was noted to still be hemostatic. Good hemostasis was noted and no blood loss was present. Attention was then turned to the vagina where the spongestick was removed.  Attempt was made again to obtain endometrial sampling with pipelle with direct visualization from above with laparoscope. The pipelle was unable to move past the cervix so procedure was aborted. The tenaculum was removed and silver nitrate was used to obtain hemostasis. The speculum was removed from the vagina and 's gloves again changed. After this the remaining peritoneal fluid was suctioned using the suction . After this the trocars were removed without difficulty and the abdomen was desufflated. The skin incisions were closed with 4-0 monocryl and dermabond was placed over them. The mora was also removed and noted to have 100 mL of clear urine. The patient was taken out of trendelenburg and extubated without difficulty and taken to the PACU in stable condition. Counts were correct x2.

## 2021-04-07 NOTE — ANESTHESIA POSTPROCEDURE EVALUATION
Post-Anesthesia Evaluation and Assessment    Patient: Richy Brady MRN: [id number]  SSN: [id number]    YOB: 1965  Age: 50 y.o. Sex: female      I have evaluated the patient and they are stable and ready for discharge from the PACU. Cardiovascular Function/Vital Signs  Visit Vitals  /76   Pulse 80   Temp 36.8 °C (98.3 °F)   Resp 16   Ht 5' 7\" (1.702 m)   Wt 78.6 kg (173 lb 5.6 oz)   SpO2 99%   BMI 27.15 kg/m²       Patient is status post General anesthesia for Procedure(s): HYSTEROSCOPY, DILITATION AND CURETTAGE, ENDOMETRIAL SAMPLING, DIAGNOSTIC LAPAROSCOPY. Nausea/Vomiting: None    Postoperative hydration reviewed and adequate. Pain:  Pain Scale 1: Numeric (0 - 10) (04/07/21 1232)  Pain Intensity 1: 0 (04/07/21 1232)   Managed    Neurological Status:   Neuro (WDL): Within Defined Limits (04/07/21 1200)  Neuro  Neurologic State: Alert (04/07/21 1200)  Orientation Level: Oriented X4 (04/07/21 1200)   At baseline    Mental Status, Level of Consciousness: Alert and  oriented to person, place, and time    Pulmonary Status:   O2 Device: Room air (04/07/21 1232)   Adequate oxygenation and airway patent    Complications related to anesthesia: None    Post-anesthesia assessment completed. No concerns    Signed By: Jamie Bermudez MD     April 7, 2021              Procedure(s): HYSTEROSCOPY, DILITATION AND CURETTAGE, ENDOMETRIAL SAMPLING, DIAGNOSTIC LAPAROSCOPY.     general    <BSHSIANPOST>    INITIAL Post-op Vital signs:   Vitals Value Taken Time   /79 04/07/21 1200   Temp 36.8 °C (98.3 °F) 04/07/21 1200   Pulse 77 04/07/21 1210   Resp 20 04/07/21 1210   SpO2 98 % 04/07/21 1210

## 2021-04-07 NOTE — ROUTINE PROCESS
Patient: Manjit Blanc MRN: [id number]  SSN: [id number] YOB: 1965  Age: 50 y.o. Sex: female Patient is status post Procedure(s): HYSTEROSCOPY, DILITATION AND CURETTAGE, ENDOMETRIAL SAMPLING, DIAGNOSTIC LAPAROSCOPY. Surgeon(s) and Role: Marcy Haywood MD - Primary Local/Dose/Irrigation:  14 ML 0.25% BUPIVACAINE Peripheral IV 04/07/21 Anterior;Proximal;Right Forearm (Active) Airway - Endotracheal Tube 04/07/21 Oral (Active) Dressing/Packing:  Incision 04/07/21 Perineum-Dressing/Treatment: Peripad (04/07/21 1102) Incision 04/07/21 Abdomen-Dressing/Treatment: Surgical glue (04/07/21 1102) Splint/Cast:  ] Other:

## 2021-05-04 ENCOUNTER — OFFICE VISIT (OUTPATIENT)
Dept: GYNECOLOGY | Age: 56
End: 2021-05-04
Payer: COMMERCIAL

## 2021-05-04 VITALS
BODY MASS INDEX: 27.4 KG/M2 | HEIGHT: 67 IN | WEIGHT: 174.6 LBS | SYSTOLIC BLOOD PRESSURE: 116 MMHG | DIASTOLIC BLOOD PRESSURE: 89 MMHG

## 2021-05-04 DIAGNOSIS — R93.89 THICKENED ENDOMETRIUM: ICD-10-CM

## 2021-05-04 DIAGNOSIS — N95.0 POSTMENOPAUSAL BLEEDING: Primary | ICD-10-CM

## 2021-05-04 PROCEDURE — 99204 OFFICE O/P NEW MOD 45 MIN: CPT | Performed by: OBSTETRICS & GYNECOLOGY

## 2021-05-04 NOTE — PROGRESS NOTES
New Patient, Referred by Dr. Freddy Crocker for PMB, D&C, endo sampling on 4/7/2021    1. Have you been to the ER, urgent care clinic since your last visit? Hospitalized since your last visit?  no    2. Have you seen or consulted any other health care providers outside of the [address] [place] [address] since your last visit? Include any pap smears or colon screening.    Yes, Dr. Freddy Crocker

## 2021-05-04 NOTE — LETTER
5/11/2021 Patient: Maine Lima YOB: 1965 Date of Visit: 5/4/2021 Jayesh Snider MD 
[address] [address] [address] [employer] Dante Graham MD 
[address] [address] [address] Fax: [phone] Dear MD Dante Bacon MD, Thank you for referring Ms. Valencia Hickman to [facility] for evaluation. My notes for this consultation are attached. If you have questions, please do not hesitate to call me. I look forward to following your patient along with you.  
 
 
Sincerely, 
 
Cory White MD

## 2021-05-11 PROBLEM — R93.89 THICKENED ENDOMETRIUM: Status: ACTIVE | Noted: 2021-05-11

## 2021-05-11 NOTE — PROGRESS NOTES
[address] [facility] [address] [place] [address]  P [phone]  F [phone]    Office Note  Patient ID:  Name:  Coni Stone  MRN:  [id number]  :  1965/55 y.o. Date:  2021      HISTORY OF PRESENT ILLNESS:  Ms. Coni Stone is a 50 y.o.  postmenopausal female who presents as a new patient from Dr. Torey Harrison for postmenopausal bleeding. The patient reports PMB that started in the fall of . She reports undergoing a pelvic ultrasound in 2020 that was noted to have a normal uterus and a thickened endometrial stripe 1.2mm, along with a complex left ovarian cyst. She was noted to have a normal Ca-125 at that time. Unable to perform EMB secondary to stenotic cervical os. On 2021 underwent attempted hysteroscopy with Dr. Torey Harrison, which was complicated by a uterine perforation. Diagnostic laparoscopy was normal, as well as noting normal tubes/ovaries. She was subsequently referred to our office for further management. Denies pelvic or abdominal pain/bloating. Still reports some vaginal spotting, but minimal amounts. Denies fevers, chills, change in appetite or bowel habits, CP, SOB, hematuria, hematochezia, or urinary symptoms. Pertinent PMH/PSH: HTN      Active, no restrictions. Imaging Review:   n/a    ROS:  A comprehensive review of systems was negative except for that written in the History of Present Illness.  , 10 point ROS    OB/GYN ROS:  Per HPI    ECOG ndGndrndanddndend:nd nd2nd Problem List:  Patient Active Problem List    Diagnosis Date Noted    Thickened endometrium 2021    Asthma     Allergy     Acid reflux     Postmenopausal bleeding 2021    Essential hypertension 2018     PMH:  Past Medical History:   Diagnosis Date    Acid reflux     Allergy     Asthma     STRESS INDUCED ASTHMA    Essential hypertension 2018    Psychiatric disorder     anxiety    Vertigo       PSH:  Past Surgical History:   Procedure Laterality Date    HX COLONOSCOPY      HX DILATION AND CURETTAGE      HX WISDOM TEETH EXTRACTION        Social History:  Social History     Tobacco Use    Smoking status: Former Smoker     Packs/day: 0.50     Years: 5.00     Pack years: 2.50     Quit date: 2001     Years since quittin.0    Smokeless tobacco: Never Used   Substance Use Topics    Alcohol use: Yes      Family History:  Family History   Problem Relation Age of Onset    Stroke Mother     Diabetes Mother     Lupus Mother     Heart Disease Father     Other Father         HISTORY OF AFIB    Hypertension Father     Hypertension Brother     Diabetes Maternal Grandmother     Heart Disease Paternal Grandfather     Cancer Paternal Aunt         Leukemia    Diabetes Maternal Aunt     Anesth Problems Neg Hx       Medications: (reviewed)  Current Outpatient Medications   Medication Sig    ibuprofen (MOTRIN) 800 mg tablet Take 1 Tab by mouth every eight (8) hours as needed for Pain.  lisinopriL (PRINIVIL, ZESTRIL) 40 mg tablet Take 1 Tab by mouth daily.  sertraline (ZOLOFT) 50 mg tablet TAKE 2 TABLETS BY MOUTH DAILY    albuterol (PROVENTIL HFA) 90 mcg/actuation inhaler Take  by inhalation. No current facility-administered medications for this visit. Allergies: (reviewed)  Allergies   Allergen Reactions    Bactrim Ds [Sulfamethoxazole-Trimethoprim] Diarrhea     mild    Erythromycin Unknown (comments)    Keflex [Cephalexin] Unknown (comments)    Sulfa (Sulfonamide Antibiotics) Rash        Gyn History:   Last pap: 2018, normal  History of abnormal pap: h/o ASCUS in       OBJECTIVE:    Physical Exam:  VITAL SIGNS: Vitals:    21 1404   BP: 116/89   Weight: 174 lb 9.6 oz (79.2 kg)   Height: 5' 7\" (1.702 m)     Body mass index is 27.35 kg/m². GENERAL JUANY: Conversant, alert, oriented. No acute distress. HEENT: HEENT. No thyroid enlargement. No JVD. Neck: Supple without restrictions.    RESPIRATORY: Clear to auscultation and percussion to the bases. No CVAT. CARDIOVASC: RRR without murmur/rub. GASTROINT: soft, non-tender, without masses or organomegaly   MUSCULOSKEL: no joint tenderness, deformity or swelling       EXTREMITIES: extremities normal, atraumatic, no cyanosis or edema   PELVIC: Exam chaperoned by nurse. Normal appearing external genitalia. On speculum exam, normal appearing vagina and cervix. On bimanual exam, the cervix and uterus are normal size and mobile. No evidence of adnexal masses or nodularity. RECTAL: deferred   LASHAUN SURVEY: No suspicious lymphadenopathy or edema noted. NEURO: Grossly intact. No acute deficit. Lab Date as available:    No results found for: WBC, HGB, HCT, PLT, MCV, HGBEXT, HCTEXT, PLTEXT, HGBEXT, HCTEXT, PLTEXT  Lab Results   Component Value Date/Time    Sodium 138 08/29/2013 02:41 PM    Potassium 3.8 08/29/2013 02:41 PM    Chloride 103 08/29/2013 02:41 PM    CO2 21 08/29/2013 02:41 PM    Glucose 72 08/29/2013 02:41 PM    BUN 7 08/29/2013 02:41 PM    Creatinine 0.64 08/29/2013 02:41 PM    BUN/Creatinine ratio 11 08/29/2013 02:41 PM    GFR est non- 08/29/2013 02:41 PM    Calcium 9.5 08/29/2013 02:41 PM         IMPRESSION/PLAN:    Ms. Gaston Menard is a 50 y.o. female with a working diagnosis of PMB and cervical stenosis. Problems:     Patient Active Problem List    Diagnosis Date Noted    Thickened endometrium 05/11/2021    Asthma     Allergy     Acid reflux     Postmenopausal bleeding 04/07/2021    Essential hypertension 04/30/2018       I reviewed Ms. Rita Bailon course to date, including her medical records, recent studies, physical exam, and review of symptoms. S/p attempted hysteroscopy/D&C on 4/7/2021 with Dr. Sylvia Rodríguez, which was complicated by uterine perforation requiring a diagnostic laparoscopy (which was normal). Counseled patient regarding PMB. Risk of malignancy is 10-20% overall.  Given her thickened endometrial stripe and PMB, I have recommended either a repeat attempt at hysteroscopy/D&C under ultra sound guidance versus a definitive hysterectomy. The patient would like to move forward with the hysterectomy, which is reasonable and appropriate. Counseled patient regarding benign, pre-malignant, and malignant conditions of the uterus. Will plan for TLH/BSO, possible exploratory laparotomy. Will inject ICG initially for possible sentinel lymph node mapping/dissection if indicated based on frozen pathology. Posted for surgery on 5/20/2021. Reviewed risks, benefits, indications, and alternatives of surgery. Will collect CBCD, CMP, CXR, and EKG prior to surgery. All questions and concerns were addressed with the patient and she is comfortable with the plan.      Defined Sensitive Document    >50% of total time allocated to visit dedicated to counseling, 50 minutes total.    Signed By: Vlad Echevarria MD     5/11/2021/7:23 AM

## 2021-05-11 NOTE — H&P (VIEW-ONLY)
[address] [facility], [address] [place], [address] [place] 
P [phone]  F [phone] Office Note Patient ID: 
Name:  Stephanie Jaime MRN:  [id number] :  1965/55 y.o. Date:  2021 HISTORY OF PRESENT ILLNESS: 
Ms. Stephanie Jaime is a 50 y.o.  postmenopausal female who presents as a new patient from Dr. Freddy Crocker for postmenopausal bleeding. The patient reports PMB that started in the fall of . She reports undergoing a pelvic ultrasound in 2020 that was noted to have a normal uterus and a thickened endometrial stripe 1.2mm, along with a complex left ovarian cyst. She was noted to have a normal Ca-125 at that time. Unable to perform EMB secondary to stenotic cervical os. On 2021 underwent attempted hysteroscopy with Dr. Freddy Crocker, which was complicated by a uterine perforation. Diagnostic laparoscopy was normal, as well as noting normal tubes/ovaries. She was subsequently referred to our office for further management. Denies pelvic or abdominal pain/bloating. Still reports some vaginal spotting, but minimal amounts. Denies fevers, chills, change in appetite or bowel habits, CP, SOB, hematuria, hematochezia, or urinary symptoms. Pertinent PMH/PSH: HTN Active, no restrictions. Imaging Review:  
n/a 
 
ROS: 
A comprehensive review of systems was negative except for that written in the History of Present Illness. , 10 point ROS 
 
OB/GYN ROS: 
Per HPI 
 
ECOG ndGndrndanddndend:nd nd2nd Problem List: 
Patient Active Problem List  
 Diagnosis Date Noted  Thickened endometrium 2021  Asthma  Allergy  Acid reflux  Postmenopausal bleeding 2021  Essential hypertension 2018 PMH: 
Past Medical History:  
Diagnosis Date  Acid reflux  Allergy  Asthma STRESS INDUCED ASTHMA  Essential hypertension 2018  Psychiatric disorder   
 anxiety  Vertigo PSH: 
Past Surgical History:  
Procedure Laterality Date  HX COLONOSCOPY    
 HX DILATION AND CURETTAGE    HX WISDOM TEETH EXTRACTION Social History: 
Social History Tobacco Use  Smoking status: Former Smoker Packs/day: 0.50 Years: 5.00 Pack years: 2.50 Quit date: 2001 Years since quittin.0  Smokeless tobacco: Never Used Substance Use Topics  Alcohol use: Yes Family History: 
Family History Problem Relation Age of Onset  Stroke Mother  Diabetes Mother  Lupus Mother  Heart Disease Father  Other Father HISTORY OF AFIB  Hypertension Father  Hypertension Brother  Diabetes Maternal Grandmother  Heart Disease Paternal Grandfather  Cancer Paternal Aunt Leukemia  Diabetes Maternal Aunt  Anesth Problems Neg Hx Medications: (reviewed) Current Outpatient Medications Medication Sig  ibuprofen (MOTRIN) 800 mg tablet Take 1 Tab by mouth every eight (8) hours as needed for Pain.  lisinopriL (PRINIVIL, ZESTRIL) 40 mg tablet Take 1 Tab by mouth daily.  sertraline (ZOLOFT) 50 mg tablet TAKE 2 TABLETS BY MOUTH DAILY  albuterol (PROVENTIL HFA) 90 mcg/actuation inhaler Take  by inhalation. No current facility-administered medications for this visit. Allergies: (reviewed) Allergies Allergen Reactions  Bactrim Ds [Sulfamethoxazole-Trimethoprim] Diarrhea  
  mild  Erythromycin Unknown (comments)  Keflex [Cephalexin] Unknown (comments)  Sulfa (Sulfonamide Antibiotics) Rash Gyn History:  
Last pap: 2018, normal 
History of abnormal pap: h/o ASCUS in  OBJECTIVE: 
 
Physical Exam: VITAL SIGNS: Vitals:  
 21 1404 BP: 116/89 Weight: 174 lb 9.6 oz (79.2 kg) Height: 5' 7\" (1.702 m) Body mass index is 27.35 kg/m². GENERAL JUANY: Conversant, alert, oriented. No acute distress. HEENT: HEENT. No thyroid enlargement. No JVD. Neck: Supple without restrictions.   
RESPIRATORY: Clear to auscultation and percussion to the bases. No CVAT. CARDIOVASC: RRR without murmur/rub. GASTROINT: soft, non-tender, without masses or organomegaly MUSCULOSKEL: no joint tenderness, deformity or swelling EXTREMITIES: extremities normal, atraumatic, no cyanosis or edema PELVIC: Exam chaperoned by nurse. Normal appearing external genitalia. On speculum exam, normal appearing vagina and cervix. On bimanual exam, the cervix and uterus are normal size and mobile. No evidence of adnexal masses or nodularity. RECTAL: deferred LASHAUN SURVEY: No suspicious lymphadenopathy or edema noted. NEURO: Grossly intact. No acute deficit. Lab Date as available: No results found for: WBC, HGB, HCT, PLT, MCV, HGBEXT, HCTEXT, PLTEXT, HGBEXT, HCTEXT, PLTEXT Lab Results Component Value Date/Time Sodium 138 08/29/2013 02:41 PM  
 Potassium 3.8 08/29/2013 02:41 PM  
 Chloride 103 08/29/2013 02:41 PM  
 CO2 21 08/29/2013 02:41 PM  
 Glucose 72 08/29/2013 02:41 PM  
 BUN 7 08/29/2013 02:41 PM  
 Creatinine 0.64 08/29/2013 02:41 PM  
 BUN/Creatinine ratio 11 08/29/2013 02:41 PM  
 GFR est non- 08/29/2013 02:41 PM  
 Calcium 9.5 08/29/2013 02:41 PM  
 
 
 
IMPRESSION/PLAN: 
 
Ms. Lucy Rogers is a 50 y.o. female with a working diagnosis of PMB and cervical stenosis. Problems: 
  
Patient Active Problem List  
 Diagnosis Date Noted  Thickened endometrium 05/11/2021  Asthma  Allergy  Acid reflux  Postmenopausal bleeding 04/07/2021  Essential hypertension 04/30/2018 I reviewed Ms. Medhat Miller course to date, including her medical records, recent studies, physical exam, and review of symptoms. S/p attempted hysteroscopy/D&C on 4/7/2021 with Dr. Jose Kinsey, which was complicated by uterine perforation requiring a diagnostic laparoscopy (which was normal). Counseled patient regarding PMB. Risk of malignancy is 10-20% overall.  Given her thickened endometrial stripe and PMB, I have recommended either a repeat attempt at hysteroscopy/D&C under ultra sound guidance versus a definitive hysterectomy. The patient would like to move forward with the hysterectomy, which is reasonable and appropriate. Counseled patient regarding benign, pre-malignant, and malignant conditions of the uterus. Will plan for TLH/BSO, possible exploratory laparotomy. Will inject ICG initially for possible sentinel lymph node mapping/dissection if indicated based on frozen pathology. Posted for surgery on 5/20/2021. Reviewed risks, benefits, indications, and alternatives of surgery. Will collect CBCD, CMP, CXR, and EKG prior to surgery. All questions and concerns were addressed with the patient and she is comfortable with the plan.   
 
Defined Sensitive Document 
 
>50% of total time allocated to visit dedicated to counseling, 50 minutes total. 
 
Signed By: Fatoumata Mosley MD   
 5/11/2021/7:23 AM

## 2021-05-12 ENCOUNTER — TRANSCRIBE ORDER (OUTPATIENT)
Dept: REGISTRATION | Age: 56
End: 2021-05-12

## 2021-05-12 DIAGNOSIS — Z01.812 PRE-PROCEDURE LAB EXAM: Primary | ICD-10-CM

## 2021-05-13 ENCOUNTER — HOSPITAL ENCOUNTER (OUTPATIENT)
Dept: GENERAL RADIOLOGY | Age: 56
Discharge: HOME | End: 2021-05-13
Attending: OBSTETRICS & GYNECOLOGY
Payer: COMMERCIAL

## 2021-05-13 ENCOUNTER — HOSPITAL ENCOUNTER (OUTPATIENT)
Dept: PREADMISSION TESTING | Age: 56
Discharge: HOME | End: 2021-05-13
Payer: COMMERCIAL

## 2021-05-13 VITALS
BODY MASS INDEX: 26.78 KG/M2 | TEMPERATURE: 98 F | HEIGHT: 67 IN | SYSTOLIC BLOOD PRESSURE: 109 MMHG | HEART RATE: 77 BPM | DIASTOLIC BLOOD PRESSURE: 73 MMHG | RESPIRATION RATE: 12 BRPM | WEIGHT: 170.64 LBS

## 2021-05-13 LAB
ALBUMIN SERPL-MCNC: 3.9 G/DL (ref 3.5–5)
ALBUMIN/GLOB SERPL: 1.4 {RATIO} (ref 1.1–2.2)
ALP SERPL-CCNC: 94 U/L (ref 45–117)
ALT SERPL-CCNC: 37 U/L (ref 12–78)
ANION GAP SERPL CALC-SCNC: 5 MMOL/L (ref 5–15)
AST SERPL-CCNC: 25 U/L (ref 15–37)
ATRIAL RATE: 62 BPM
BASOPHILS # BLD: 0.1 K/UL (ref 0–0.1)
BASOPHILS NFR BLD: 1 % (ref 0–1)
BILIRUB SERPL-MCNC: 0.5 MG/DL (ref 0.2–1)
BUN SERPL-MCNC: 12 MG/DL (ref 6–20)
BUN/CREAT SERPL: 20 (ref 12–20)
CALCIUM SERPL-MCNC: 9.7 MG/DL (ref 8.5–10.1)
CALCULATED P AXIS, ECG09: 8 DEGREES
CALCULATED R AXIS, ECG10: 34 DEGREES
CALCULATED T AXIS, ECG11: 40 DEGREES
CHLORIDE SERPL-SCNC: 108 MMOL/L (ref 97–108)
CO2 SERPL-SCNC: 26 MMOL/L (ref 21–32)
CREAT SERPL-MCNC: 0.6 MG/DL (ref 0.55–1.02)
DIAGNOSIS, 93000: NORMAL
DIFFERENTIAL METHOD BLD: NORMAL
EOSINOPHIL # BLD: 0.1 K/UL (ref 0–0.4)
EOSINOPHIL NFR BLD: 2 % (ref 0–7)
ERYTHROCYTE [DISTWIDTH] IN BLOOD BY AUTOMATED COUNT: 13.3 % (ref 11.5–14.5)
EST. AVERAGE GLUCOSE BLD GHB EST-MCNC: 111 MG/DL
GLOBULIN SER CALC-MCNC: 2.8 G/DL (ref 2–4)
GLUCOSE SERPL-MCNC: 90 MG/DL (ref 65–100)
HBA1C MFR BLD: 5.5 % (ref 4–5.6)
HCT VFR BLD AUTO: 43.8 % (ref 35–47)
HGB BLD-MCNC: 14.2 G/DL (ref 11.5–16)
IMM GRANULOCYTES # BLD AUTO: 0 K/UL (ref 0–0.04)
IMM GRANULOCYTES NFR BLD AUTO: 0 % (ref 0–0.5)
LYMPHOCYTES # BLD: 1.6 K/UL (ref 0.8–3.5)
LYMPHOCYTES NFR BLD: 31 % (ref 12–49)
MCH RBC QN AUTO: 29 PG (ref 26–34)
MCHC RBC AUTO-ENTMCNC: 32.4 G/DL (ref 30–36.5)
MCV RBC AUTO: 89.4 FL (ref 80–99)
MONOCYTES # BLD: 0.5 K/UL (ref 0–1)
MONOCYTES NFR BLD: 9 % (ref 5–13)
NEUTS SEG # BLD: 3 K/UL (ref 1.8–8)
NEUTS SEG NFR BLD: 57 % (ref 32–75)
NRBC # BLD: 0 K/UL (ref 0–0.01)
NRBC BLD-RTO: 0 PER 100 WBC
P-R INTERVAL, ECG05: 150 MS
PLATELET # BLD AUTO: 302 K/UL (ref 150–400)
PMV BLD AUTO: 10.2 FL (ref 8.9–12.9)
POTASSIUM SERPL-SCNC: 4.3 MMOL/L (ref 3.5–5.1)
PROT SERPL-MCNC: 6.7 G/DL (ref 6.4–8.2)
Q-T INTERVAL, ECG07: 388 MS
QRS DURATION, ECG06: 84 MS
QTC CALCULATION (BEZET), ECG08: 393 MS
RBC # BLD AUTO: 4.9 M/UL (ref 3.8–5.2)
SODIUM SERPL-SCNC: 139 MMOL/L (ref 136–145)
VENTRICULAR RATE, ECG03: 62 BPM
WBC # BLD AUTO: 5.2 K/UL (ref 3.6–11)

## 2021-05-13 PROCEDURE — 36415 COLL VENOUS BLD VENIPUNCTURE: CPT

## 2021-05-13 PROCEDURE — 85025 COMPLETE CBC W/AUTO DIFF WBC: CPT

## 2021-05-13 PROCEDURE — 71046 X-RAY EXAM CHEST 2 VIEWS: CPT

## 2021-05-13 PROCEDURE — 83036 HEMOGLOBIN GLYCOSYLATED A1C: CPT

## 2021-05-13 PROCEDURE — 93005 ELECTROCARDIOGRAM TRACING: CPT

## 2021-05-13 PROCEDURE — 80053 COMPREHEN METABOLIC PANEL: CPT

## 2021-05-13 NOTE — PERIOP NOTES
Preoperative instructions reviewed with patient. Patient given SSI infection FAQS sheet. Given two bottles of skin prep chlorhexidine soap; given written and verbal instructions on use. Patient was given the opportunity to ask questions on the information provided. Written information on COVID19 testing prior to surgery given to patient and discussed. Information on where to report day of surgery also given to patient and discussed. Map of COVID testing site provided to patient. Surgical consent obtained and signed by patient. Instructed to obtain chest xray at [address] [place] [address] upon leaving PAT Department.

## 2021-05-17 ENCOUNTER — TRANSCRIBE ORDER (OUTPATIENT)
Dept: REGISTRATION | Age: 56
End: 2021-05-17

## 2021-05-17 ENCOUNTER — HOSPITAL ENCOUNTER (OUTPATIENT)
Dept: PREADMISSION TESTING | Age: 56
Discharge: HOME | End: 2021-05-17
Payer: COMMERCIAL

## 2021-05-17 DIAGNOSIS — Z01.812 PRE-PROCEDURE LAB EXAM: ICD-10-CM

## 2021-05-17 DIAGNOSIS — Z01.812 PRE-PROCEDURE LAB EXAM: Primary | ICD-10-CM

## 2021-05-17 PROCEDURE — U0005 INFEC AGEN DETEC AMPLI PROBE: HCPCS

## 2021-05-18 LAB — SARS-COV-2, COV2NT: NOT DETECTED

## 2021-05-19 ENCOUNTER — TELEPHONE (OUTPATIENT)
Dept: GYNECOLOGY | Age: 56
End: 2021-05-19

## 2021-05-19 NOTE — TELEPHONE ENCOUNTER
S/w patient, she wanted to know if she could walk up her stairs to get to there bedroom after surgery, advised yes, may have to go slow but stairs are ok

## 2021-05-20 ENCOUNTER — ANESTHESIA EVENT (OUTPATIENT)
Dept: SURGERY | Age: 56
End: 2021-05-20
Payer: COMMERCIAL

## 2021-05-20 ENCOUNTER — HOSPITAL ENCOUNTER (OUTPATIENT)
Age: 56
Discharge: HOME | End: 2021-05-20
Attending: OBSTETRICS & GYNECOLOGY | Admitting: OBSTETRICS & GYNECOLOGY
Payer: COMMERCIAL

## 2021-05-20 ENCOUNTER — ANESTHESIA (OUTPATIENT)
Dept: SURGERY | Age: 56
End: 2021-05-20
Payer: COMMERCIAL

## 2021-05-20 VITALS
WEIGHT: 170 LBS | RESPIRATION RATE: 16 BRPM | HEART RATE: 97 BPM | SYSTOLIC BLOOD PRESSURE: 122 MMHG | OXYGEN SATURATION: 95 % | TEMPERATURE: 97.7 F | DIASTOLIC BLOOD PRESSURE: 63 MMHG | BODY MASS INDEX: 26.68 KG/M2 | HEIGHT: 67 IN

## 2021-05-20 DIAGNOSIS — G89.18 POSTOPERATIVE PAIN: Primary | ICD-10-CM

## 2021-05-20 PROBLEM — N93.9 ABNORMAL UTERINE BLEEDING: Status: ACTIVE | Noted: 2021-05-20

## 2021-05-20 LAB — HCG UR QL: NEGATIVE

## 2021-05-20 PROCEDURE — 74011000254 HC RX REV CODE- 254: Performed by: OBSTETRICS & GYNECOLOGY

## 2021-05-20 PROCEDURE — 74011000250 HC RX REV CODE- 250: Performed by: NURSE ANESTHETIST, CERTIFIED REGISTERED

## 2021-05-20 PROCEDURE — 74011250636 HC RX REV CODE- 250/636: Performed by: ANESTHESIOLOGY

## 2021-05-20 PROCEDURE — 77030009957 HC RELD ENDOSTCH COVD -C: Performed by: OBSTETRICS & GYNECOLOGY

## 2021-05-20 PROCEDURE — 77030031139 HC SUT VCRL2 J&J -A: Performed by: OBSTETRICS & GYNECOLOGY

## 2021-05-20 PROCEDURE — 88307 TISSUE EXAM BY PATHOLOGIST: CPT

## 2021-05-20 PROCEDURE — 77030039895 HC SYST SMK EVAC LAP COVD -B: Performed by: OBSTETRICS & GYNECOLOGY

## 2021-05-20 PROCEDURE — 74011250636 HC RX REV CODE- 250/636: Performed by: NURSE ANESTHETIST, CERTIFIED REGISTERED

## 2021-05-20 PROCEDURE — 77030020263 HC SOL INJ SOD CL0.9% LFCR 1000ML: Performed by: OBSTETRICS & GYNECOLOGY

## 2021-05-20 PROCEDURE — 77030040361 HC SLV COMPR DVT MDII -B: Performed by: OBSTETRICS & GYNECOLOGY

## 2021-05-20 PROCEDURE — 74011000250 HC RX REV CODE- 250: Performed by: OBSTETRICS & GYNECOLOGY

## 2021-05-20 PROCEDURE — 77030022703 HC LIGASURE  BLNT LAPSCP SEAL COVD -E: Performed by: OBSTETRICS & GYNECOLOGY

## 2021-05-20 PROCEDURE — 77030010031 HC SCIS ENDOSC MPLR J&J -C: Performed by: OBSTETRICS & GYNECOLOGY

## 2021-05-20 PROCEDURE — 77030008602 HC TRCR ENDOSC EPATH J&J -B: Performed by: OBSTETRICS & GYNECOLOGY

## 2021-05-20 PROCEDURE — 77030014008 HC SPNG HEMSTAT J&J -C: Performed by: OBSTETRICS & GYNECOLOGY

## 2021-05-20 PROCEDURE — 76060000035 HC ANESTHESIA 2 TO 2.5 HR: Performed by: OBSTETRICS & GYNECOLOGY

## 2021-05-20 PROCEDURE — 74011250637 HC RX REV CODE- 250/637: Performed by: ANESTHESIOLOGY

## 2021-05-20 PROCEDURE — 74011250637 HC RX REV CODE- 250/637: Performed by: PHYSICIAN ASSISTANT

## 2021-05-20 PROCEDURE — 77030016151 HC PROTCTR LNS DFOG COVD -B: Performed by: OBSTETRICS & GYNECOLOGY

## 2021-05-20 PROCEDURE — 2709999900 HC NON-CHARGEABLE SUPPLY: Performed by: OBSTETRICS & GYNECOLOGY

## 2021-05-20 PROCEDURE — 77030012799 HC TRCR GELPRT BLN AMR -B: Performed by: OBSTETRICS & GYNECOLOGY

## 2021-05-20 PROCEDURE — 77030040830 HC CATH URETH FOL MDII -A: Performed by: OBSTETRICS & GYNECOLOGY

## 2021-05-20 PROCEDURE — 81025 URINE PREGNANCY TEST: CPT

## 2021-05-20 PROCEDURE — 76010000131 HC OR TIME 2 TO 2.5 HR: Performed by: OBSTETRICS & GYNECOLOGY

## 2021-05-20 PROCEDURE — 77030008756 HC TU IRR SUC STRY -B: Performed by: OBSTETRICS & GYNECOLOGY

## 2021-05-20 PROCEDURE — 77030012770 HC TRCR OPT FX AMR -B: Performed by: OBSTETRICS & GYNECOLOGY

## 2021-05-20 PROCEDURE — 74011250636 HC RX REV CODE- 250/636: Performed by: PHYSICIAN ASSISTANT

## 2021-05-20 PROCEDURE — 77030022704 HC SUT VLOC COVD -B: Performed by: OBSTETRICS & GYNECOLOGY

## 2021-05-20 PROCEDURE — 74011250636 HC RX REV CODE- 250/636: Performed by: OBSTETRICS & GYNECOLOGY

## 2021-05-20 PROCEDURE — 77030010507 HC ADH SKN DERMBND J&J -B: Performed by: OBSTETRICS & GYNECOLOGY

## 2021-05-20 PROCEDURE — 77030002933 HC SUT MCRYL J&J -A: Performed by: OBSTETRICS & GYNECOLOGY

## 2021-05-20 PROCEDURE — 77030018778 HC MANIP UTER VCAR CNMD -B: Performed by: OBSTETRICS & GYNECOLOGY

## 2021-05-20 PROCEDURE — 76210000006 HC OR PH I REC 0.5 TO 1 HR: Performed by: OBSTETRICS & GYNECOLOGY

## 2021-05-20 PROCEDURE — 77030026438 HC STYL ET INTUB CARD -A: Performed by: ANESTHESIOLOGY

## 2021-05-20 PROCEDURE — 88331 PATH CONSLTJ SURG 1 BLK 1SPC: CPT

## 2021-05-20 PROCEDURE — 77030008684 HC TU ET CUF COVD -B: Performed by: ANESTHESIOLOGY

## 2021-05-20 PROCEDURE — 77030008603 HC TRCR ENDOSC EPATH J&J -C: Performed by: OBSTETRICS & GYNECOLOGY

## 2021-05-20 RX ORDER — CLINDAMYCIN PHOSPHATE 900 MG/50ML
900 INJECTION INTRAVENOUS ONCE
Status: COMPLETED | OUTPATIENT
Start: 2021-05-20 | End: 2021-05-20

## 2021-05-20 RX ORDER — ONDANSETRON 2 MG/ML
4 INJECTION INTRAMUSCULAR; INTRAVENOUS
Status: DISCONTINUED | OUTPATIENT
Start: 2021-05-20 | End: 2021-05-20 | Stop reason: HOSPADM

## 2021-05-20 RX ORDER — ONDANSETRON 2 MG/ML
INJECTION INTRAMUSCULAR; INTRAVENOUS AS NEEDED
Status: DISCONTINUED | OUTPATIENT
Start: 2021-05-20 | End: 2021-05-20 | Stop reason: HOSPADM

## 2021-05-20 RX ORDER — ACETAMINOPHEN 325 MG/1
975 TABLET ORAL EVERY 6 HOURS
Status: DISCONTINUED | OUTPATIENT
Start: 2021-05-20 | End: 2021-05-20 | Stop reason: HOSPADM

## 2021-05-20 RX ORDER — PROPOFOL 10 MG/ML
INJECTION, EMULSION INTRAVENOUS AS NEEDED
Status: DISCONTINUED | OUTPATIENT
Start: 2021-05-20 | End: 2021-05-20 | Stop reason: HOSPADM

## 2021-05-20 RX ORDER — ACETAMINOPHEN 325 MG/1
650 TABLET ORAL
Qty: 60 TABLET | Refills: 0 | Status: SHIPPED | OUTPATIENT
Start: 2021-05-20 | End: 2021-05-24

## 2021-05-20 RX ORDER — LIDOCAINE HYDROCHLORIDE 20 MG/ML
INJECTION, SOLUTION EPIDURAL; INFILTRATION; INTRACAUDAL; PERINEURAL AS NEEDED
Status: DISCONTINUED | OUTPATIENT
Start: 2021-05-20 | End: 2021-05-20 | Stop reason: HOSPADM

## 2021-05-20 RX ORDER — SODIUM CHLORIDE 0.9 % (FLUSH) 0.9 %
5-40 SYRINGE (ML) INJECTION AS NEEDED
Status: DISCONTINUED | OUTPATIENT
Start: 2021-05-20 | End: 2021-05-20 | Stop reason: HOSPADM

## 2021-05-20 RX ORDER — DEXAMETHASONE SODIUM PHOSPHATE 4 MG/ML
INJECTION, SOLUTION INTRA-ARTICULAR; INTRALESIONAL; INTRAMUSCULAR; INTRAVENOUS; SOFT TISSUE AS NEEDED
Status: DISCONTINUED | OUTPATIENT
Start: 2021-05-20 | End: 2021-05-20 | Stop reason: HOSPADM

## 2021-05-20 RX ORDER — SODIUM CHLORIDE 0.9 % (FLUSH) 0.9 %
5-40 SYRINGE (ML) INJECTION EVERY 8 HOURS
Status: DISCONTINUED | OUTPATIENT
Start: 2021-05-20 | End: 2021-05-20 | Stop reason: HOSPADM

## 2021-05-20 RX ORDER — FENTANYL CITRATE 50 UG/ML
50 INJECTION, SOLUTION INTRAMUSCULAR; INTRAVENOUS AS NEEDED
Status: DISCONTINUED | OUTPATIENT
Start: 2021-05-20 | End: 2021-05-20 | Stop reason: HOSPADM

## 2021-05-20 RX ORDER — MIDAZOLAM HYDROCHLORIDE 1 MG/ML
1 INJECTION, SOLUTION INTRAMUSCULAR; INTRAVENOUS AS NEEDED
Status: DISCONTINUED | OUTPATIENT
Start: 2021-05-20 | End: 2021-05-20 | Stop reason: HOSPADM

## 2021-05-20 RX ORDER — HYDROMORPHONE HYDROCHLORIDE 2 MG/ML
INJECTION, SOLUTION INTRAMUSCULAR; INTRAVENOUS; SUBCUTANEOUS AS NEEDED
Status: DISCONTINUED | OUTPATIENT
Start: 2021-05-20 | End: 2021-05-20 | Stop reason: HOSPADM

## 2021-05-20 RX ORDER — TRAMADOL HYDROCHLORIDE 50 MG/1
50-100 TABLET ORAL
Qty: 20 TABLET | Refills: 0 | Status: SHIPPED | OUTPATIENT
Start: 2021-05-20 | End: 2021-05-23

## 2021-05-20 RX ORDER — SUCCINYLCHOLINE CHLORIDE 20 MG/ML
INJECTION INTRAMUSCULAR; INTRAVENOUS AS NEEDED
Status: DISCONTINUED | OUTPATIENT
Start: 2021-05-20 | End: 2021-05-20 | Stop reason: HOSPADM

## 2021-05-20 RX ORDER — NALOXONE HYDROCHLORIDE 0.4 MG/ML
0.4 INJECTION, SOLUTION INTRAMUSCULAR; INTRAVENOUS; SUBCUTANEOUS AS NEEDED
Status: DISCONTINUED | OUTPATIENT
Start: 2021-05-20 | End: 2021-05-20 | Stop reason: HOSPADM

## 2021-05-20 RX ORDER — ONDANSETRON 2 MG/ML
4 INJECTION INTRAMUSCULAR; INTRAVENOUS AS NEEDED
Status: DISCONTINUED | OUTPATIENT
Start: 2021-05-20 | End: 2021-05-20 | Stop reason: HOSPADM

## 2021-05-20 RX ORDER — SODIUM CHLORIDE, SODIUM LACTATE, POTASSIUM CHLORIDE, CALCIUM CHLORIDE 600; 310; 30; 20 MG/100ML; MG/100ML; MG/100ML; MG/100ML
INJECTION, SOLUTION INTRAVENOUS
Status: DISCONTINUED | OUTPATIENT
Start: 2021-05-20 | End: 2021-05-20 | Stop reason: HOSPADM

## 2021-05-20 RX ORDER — LISINOPRIL 20 MG/1
40 TABLET ORAL DAILY
Status: DISCONTINUED | OUTPATIENT
Start: 2021-05-21 | End: 2021-05-20 | Stop reason: HOSPADM

## 2021-05-20 RX ORDER — TRAMADOL HYDROCHLORIDE 50 MG/1
50-100 TABLET ORAL
Status: DISCONTINUED | OUTPATIENT
Start: 2021-05-20 | End: 2021-05-20 | Stop reason: HOSPADM

## 2021-05-20 RX ORDER — SODIUM CHLORIDE, SODIUM LACTATE, POTASSIUM CHLORIDE, CALCIUM CHLORIDE 600; 310; 30; 20 MG/100ML; MG/100ML; MG/100ML; MG/100ML
50 INJECTION, SOLUTION INTRAVENOUS CONTINUOUS
Status: DISCONTINUED | OUTPATIENT
Start: 2021-05-20 | End: 2021-05-20 | Stop reason: HOSPADM

## 2021-05-20 RX ORDER — ROCURONIUM BROMIDE 10 MG/ML
INJECTION, SOLUTION INTRAVENOUS AS NEEDED
Status: DISCONTINUED | OUTPATIENT
Start: 2021-05-20 | End: 2021-05-20 | Stop reason: HOSPADM

## 2021-05-20 RX ORDER — MIDAZOLAM HYDROCHLORIDE 1 MG/ML
INJECTION, SOLUTION INTRAMUSCULAR; INTRAVENOUS AS NEEDED
Status: DISCONTINUED | OUTPATIENT
Start: 2021-05-20 | End: 2021-05-20 | Stop reason: HOSPADM

## 2021-05-20 RX ORDER — HYDROMORPHONE HYDROCHLORIDE 1 MG/ML
0.2 INJECTION, SOLUTION INTRAMUSCULAR; INTRAVENOUS; SUBCUTANEOUS
Status: DISCONTINUED | OUTPATIENT
Start: 2021-05-20 | End: 2021-05-20 | Stop reason: HOSPADM

## 2021-05-20 RX ORDER — BUPIVACAINE HYDROCHLORIDE 5 MG/ML
INJECTION, SOLUTION EPIDURAL; INTRACAUDAL AS NEEDED
Status: DISCONTINUED | OUTPATIENT
Start: 2021-05-20 | End: 2021-05-20 | Stop reason: HOSPADM

## 2021-05-20 RX ORDER — DEXMEDETOMIDINE HYDROCHLORIDE 100 UG/ML
INJECTION, SOLUTION INTRAVENOUS AS NEEDED
Status: DISCONTINUED | OUTPATIENT
Start: 2021-05-20 | End: 2021-05-20 | Stop reason: HOSPADM

## 2021-05-20 RX ORDER — INDOCYANINE GREEN AND WATER 25 MG
KIT INJECTION AS NEEDED
Status: DISCONTINUED | OUTPATIENT
Start: 2021-05-20 | End: 2021-05-20 | Stop reason: HOSPADM

## 2021-05-20 RX ORDER — HYDROMORPHONE HYDROCHLORIDE 1 MG/ML
0.5 INJECTION, SOLUTION INTRAMUSCULAR; INTRAVENOUS; SUBCUTANEOUS
Status: DISCONTINUED | OUTPATIENT
Start: 2021-05-20 | End: 2021-05-20 | Stop reason: HOSPADM

## 2021-05-20 RX ORDER — PROCHLORPERAZINE EDISYLATE 5 MG/ML
10 INJECTION INTRAMUSCULAR; INTRAVENOUS
Status: DISCONTINUED | OUTPATIENT
Start: 2021-05-20 | End: 2021-05-20 | Stop reason: SDUPTHER

## 2021-05-20 RX ORDER — ACETAMINOPHEN 325 MG/1
650 TABLET ORAL ONCE
Status: COMPLETED | OUTPATIENT
Start: 2021-05-20 | End: 2021-05-20

## 2021-05-20 RX ORDER — FENTANYL CITRATE 50 UG/ML
INJECTION, SOLUTION INTRAMUSCULAR; INTRAVENOUS AS NEEDED
Status: DISCONTINUED | OUTPATIENT
Start: 2021-05-20 | End: 2021-05-20 | Stop reason: HOSPADM

## 2021-05-20 RX ORDER — FENTANYL CITRATE 50 UG/ML
25 INJECTION, SOLUTION INTRAMUSCULAR; INTRAVENOUS
Status: COMPLETED | OUTPATIENT
Start: 2021-05-20 | End: 2021-05-20

## 2021-05-20 RX ORDER — DOCUSATE SODIUM 100 MG/1
100 CAPSULE, LIQUID FILLED ORAL 2 TIMES DAILY
Qty: 40 CAPSULE | Refills: 1 | Status: SHIPPED | OUTPATIENT
Start: 2021-05-20 | End: 2021-06-30 | Stop reason: ALTCHOICE

## 2021-05-20 RX ORDER — EPHEDRINE SULFATE/0.9% NACL/PF 50 MG/5 ML
SYRINGE (ML) INTRAVENOUS AS NEEDED
Status: DISCONTINUED | OUTPATIENT
Start: 2021-05-20 | End: 2021-05-20 | Stop reason: HOSPADM

## 2021-05-20 RX ORDER — SODIUM CHLORIDE 9 MG/ML
75 INJECTION, SOLUTION INTRAVENOUS CONTINUOUS
Status: DISCONTINUED | OUTPATIENT
Start: 2021-05-20 | End: 2021-05-20 | Stop reason: HOSPADM

## 2021-05-20 RX ORDER — SERTRALINE HYDROCHLORIDE 50 MG/1
50 TABLET, FILM COATED ORAL DAILY
Status: DISCONTINUED | OUTPATIENT
Start: 2021-05-21 | End: 2021-05-20 | Stop reason: HOSPADM

## 2021-05-20 RX ORDER — LIDOCAINE HYDROCHLORIDE 10 MG/ML
0.1 INJECTION, SOLUTION EPIDURAL; INFILTRATION; INTRACAUDAL; PERINEURAL AS NEEDED
Status: DISCONTINUED | OUTPATIENT
Start: 2021-05-20 | End: 2021-05-20 | Stop reason: HOSPADM

## 2021-05-20 RX ORDER — DOCUSATE SODIUM 100 MG/1
200 CAPSULE, LIQUID FILLED ORAL DAILY
Status: DISCONTINUED | OUTPATIENT
Start: 2021-05-21 | End: 2021-05-20 | Stop reason: HOSPADM

## 2021-05-20 RX ADMIN — CLINDAMYCIN IN 5 PERCENT DEXTROSE 900 MG: 18 INJECTION, SOLUTION INTRAVENOUS at 08:16

## 2021-05-20 RX ADMIN — HYDROMORPHONE HYDROCHLORIDE 0.5 MG: 2 INJECTION, SOLUTION INTRAMUSCULAR; INTRAVENOUS; SUBCUTANEOUS at 10:05

## 2021-05-20 RX ADMIN — ROCURONIUM BROMIDE 5 MG: 10 SOLUTION INTRAVENOUS at 08:05

## 2021-05-20 RX ADMIN — FENTANYL CITRATE 50 MCG: 50 INJECTION, SOLUTION INTRAMUSCULAR; INTRAVENOUS at 08:05

## 2021-05-20 RX ADMIN — SUCCINYLCHOLINE CHLORIDE 140 MG: 20 INJECTION, SOLUTION INTRAMUSCULAR; INTRAVENOUS at 08:05

## 2021-05-20 RX ADMIN — Medication 800 MCG: at 08:38

## 2021-05-20 RX ADMIN — SODIUM CHLORIDE, POTASSIUM CHLORIDE, SODIUM LACTATE AND CALCIUM CHLORIDE: 600; 310; 30; 20 INJECTION, SOLUTION INTRAVENOUS at 07:56

## 2021-05-20 RX ADMIN — SODIUM CHLORIDE 75 ML/HR: 9 INJECTION, SOLUTION INTRAVENOUS at 10:47

## 2021-05-20 RX ADMIN — MIDAZOLAM 2 MG: 1 INJECTION INTRAMUSCULAR; INTRAVENOUS at 07:56

## 2021-05-20 RX ADMIN — FENTANYL CITRATE 25 MCG: 50 INJECTION, SOLUTION INTRAMUSCULAR; INTRAVENOUS at 10:40

## 2021-05-20 RX ADMIN — FENTANYL CITRATE 50 MCG: 50 INJECTION, SOLUTION INTRAMUSCULAR; INTRAVENOUS at 09:49

## 2021-05-20 RX ADMIN — SODIUM CHLORIDE, POTASSIUM CHLORIDE, SODIUM LACTATE AND CALCIUM CHLORIDE: 600; 310; 30; 20 INJECTION, SOLUTION INTRAVENOUS at 09:38

## 2021-05-20 RX ADMIN — FENTANYL CITRATE 25 MCG: 50 INJECTION, SOLUTION INTRAMUSCULAR; INTRAVENOUS at 10:35

## 2021-05-20 RX ADMIN — PROPOFOL 200 MG: 10 INJECTION, EMULSION INTRAVENOUS at 08:05

## 2021-05-20 RX ADMIN — ONDANSETRON HYDROCHLORIDE 4 MG: 2 INJECTION, SOLUTION INTRAMUSCULAR; INTRAVENOUS at 08:11

## 2021-05-20 RX ADMIN — ROCURONIUM BROMIDE 35 MG: 10 SOLUTION INTRAVENOUS at 08:11

## 2021-05-20 RX ADMIN — DEXMEDETOMIDINE HYDROCHLORIDE 8 MCG: 100 INJECTION, SOLUTION, CONCENTRATE INTRAVENOUS at 09:49

## 2021-05-20 RX ADMIN — SUGAMMADEX 200 MG: 100 INJECTION, SOLUTION INTRAVENOUS at 09:40

## 2021-05-20 RX ADMIN — ACETAMINOPHEN 975 MG: 325 TABLET ORAL at 12:33

## 2021-05-20 RX ADMIN — SODIUM CHLORIDE, POTASSIUM CHLORIDE, SODIUM LACTATE AND CALCIUM CHLORIDE 50 ML/HR: 600; 310; 30; 20 INJECTION, SOLUTION INTRAVENOUS at 06:52

## 2021-05-20 RX ADMIN — FENTANYL CITRATE 25 MCG: 50 INJECTION, SOLUTION INTRAMUSCULAR; INTRAVENOUS at 10:21

## 2021-05-20 RX ADMIN — LIDOCAINE HYDROCHLORIDE 60 MG: 20 INJECTION, SOLUTION EPIDURAL; INFILTRATION; INTRACAUDAL; PERINEURAL at 08:05

## 2021-05-20 RX ADMIN — HYDROMORPHONE HYDROCHLORIDE 0.5 MG: 2 INJECTION, SOLUTION INTRAMUSCULAR; INTRAVENOUS; SUBCUTANEOUS at 09:49

## 2021-05-20 RX ADMIN — ACETAMINOPHEN 650 MG: 325 TABLET ORAL at 06:54

## 2021-05-20 RX ADMIN — DEXAMETHASONE SODIUM PHOSPHATE 8 MG: 4 INJECTION, SOLUTION INTRAMUSCULAR; INTRAVENOUS at 08:11

## 2021-05-20 RX ADMIN — HYDROMORPHONE HYDROCHLORIDE 1 MG: 2 INJECTION, SOLUTION INTRAMUSCULAR; INTRAVENOUS; SUBCUTANEOUS at 08:26

## 2021-05-20 RX ADMIN — FENTANYL CITRATE 25 MCG: 50 INJECTION, SOLUTION INTRAMUSCULAR; INTRAVENOUS at 11:45

## 2021-05-20 NOTE — PROGRESS NOTES
I have reviewed discharge instructions with the patient. The patient verbalized understanding. Discharge instructions given to patient. Discharged meds reviewed with pt.

## 2021-05-20 NOTE — BRIEF OP NOTE
Brief Postoperative Note    Patient: Gaston Menard  YOB: 1965  MRN: [id number]    Date of Procedure: 5/20/2021     Pre-Op Diagnosis: POST MENOPAUSAL BLEEDING    Post-Op Diagnosis: Same as preoperative diagnosis. Procedure(s):  TOTAL LAPAROSCOPIC HYSTERECTOMY, BILATERAL SALPINGO-OOPHORECTOMY, SENTINEL LYMPH NODE MAPPING    Surgeon(s): Eleuterio Dwyer MD    Surgical Assistant: Physician Assistant: Zulema Liriano PA-C    Anesthesia: General     Estimated Blood Loss (mL): Minimal    Complications: None    Specimens:   ID Type Source Tests Collected by Time Destination   1 : UTERUS, CERVIX, BILATERAL FALLOPIAN TUBES AND OVARIES Frozen Section Uterus with Bilateral Fallopian tubes and Ovaries  Eleuterio Dwyer MD 5/20/2021 5490 Pathology        Implants: * No implants in log *    Drains: * No LDAs found *    Findings: On laparoscopic survey, the uterus and bilateral tubes/ovaries are normal appearing. [place] lymph node mapping did not map well on the left side. The right sentinel lymph node mapped to the right obturator space, proximally. Some thin adhesions of the omentum to the anterior abdominal wall in the right lower quadrant. Normal appearing rectosigmoid colon, liver edge, small bowel, diaphragm, omentum, and peritoneum. Frozen pathology of the endometrium was benign endometrium. Given benign pathology, the sentinel lymph nodes were not removed.      Electronically Signed by Daron Link MD on 5/20/2021 at 9:49 AM

## 2021-05-20 NOTE — PROGRESS NOTES
1100: TRANSFER - IN REPORT:    Verbal report received from SANTO RN(name) on Budd Linker  being received from PACU(unit) for routine post - op      Report consisted of patients Situation, Background, Assessment and   Recommendations(SBAR). Information from the following report(s) SBAR, Kardex, Intake/Output, MAR, Accordion and Recent Results was reviewed with the receiving nurse. Opportunity for questions and clarification was provided. Assessment completed upon patients arrival to unit and care assumed.

## 2021-05-20 NOTE — ANESTHESIA POSTPROCEDURE EVALUATION
Post-Anesthesia Evaluation and Assessment    Patient: Deshawn Ferreira MRN: [id number]  SSN: [id number]    YOB: 1965  Age: 50 y.o. Sex: female      I have evaluated the patient and they are stable and ready for discharge from the PACU. Cardiovascular Function/Vital Signs  Visit Vitals  /73   Pulse (!) 101   Temp 36.4 °C (97.6 °F)   Resp 14   Ht 5' 7\" (1.702 m)   Wt 77.1 kg (170 lb)   SpO2 96%   BMI 26.63 kg/m²       Patient is status post General anesthesia for Procedure(s):  TOTAL LAPAROSCOPIC HYSTERECTOMY, BILATERAL SALPINGO-OOPHORECTOMY, SENTINEL LYMPH NODE MAPPING. Nausea/Vomiting: None    Postoperative hydration reviewed and adequate. Pain:  Pain Scale 1: Numeric (0 - 10) (05/20/21 1007)  Pain Intensity 1: 3 (05/20/21 1007)   Managed    Neurological Status:   Neuro (WDL): Within Defined Limits (05/20/21 1007)   At baseline    Mental Status, Level of Consciousness: Alert and  oriented to person, place, and time    Pulmonary Status:   O2 Device: None (Room air) (05/20/21 1015)   Adequate oxygenation and airway patent    Complications related to anesthesia: None    Post-anesthesia assessment completed.  No concerns    Signed By: Kati Schwartz MD     May 20, 2021

## 2021-05-20 NOTE — PERIOP NOTES
SURGICEL FIBRILLAR ADDED TO STERILE FIELD TO BE USED BY SURGEON AS NEEDED FOR HEMOSTASIS.  LOT# [id number] REF# [id number] EXP 06/30/2023

## 2021-05-20 NOTE — INTERVAL H&P NOTE
Date of Surgery Update: Herminio Suero was seen and examined. History and physical has been reviewed. The patient has been examined.  There have been no significant clinical changes since the completion of the originally dated History and Physical.    Signed By: Surinder Pierce MD     May 20, 2021 7:02 AM

## 2021-05-20 NOTE — OP NOTES
Gynecologic Oncology Operative Report    Guadalupe Johnson  5/20/2021    PREOPERATIVE DIAGNOSIS:  1) Post-menopausal bleeding; 2) Cervical stenosis    POSTOPERATIVE DIAGNOSIS:  Same    PROCEDURE:    Total laparoscopic hysterectomy with bilateral salpingo-oophorectomy, Bilateral sentinel lymph node mapping     Surgeon:  Sheryle Settler, MD    Assistant:  Madelyn Mahajan surgical assistance was required throughout the case due to the complexity of the procedure. He assisted with dissection, development of the retroperitoneal spaces, and identification of pertinent anatomy during the procedure. Anesthesia:  General endotracheal anesthesia    EBL:  <10 cc     Preoperative antibiotics: Cefotetan 2grams     VTE Prophylaxis: SCDs     Complications:  None    Implants: none    Specimens:  Uterus, cervix, bilateral fallopian tubes and ovaries, pelvic washings    Operative indications:  50 y.o. female with PMB s/p failed D&C with uterine perforation with Dr. Guillermina Panda. Normal laparoscopy at the time of perforation. Now presenting for hysterectomy. Operative findings: On laparoscopic survey, the uterus and bilateral tubes/ovaries are normal appearing. [place] lymph node mapping did not map well on the left side. The right sentinel lymph node mapped to the right obturator space, proximally. Some thin adhesions of the omentum to the anterior abdominal wall in the right lower quadrant. Normal appearing rectosigmoid colon, liver edge, small bowel, diaphragm, omentum, and peritoneum. Frozen pathology of the endometrium was benign endometrium. Given benign pathology, the sentinel lymph nodes were not removed. Operative note:  After the risks, benefits, indications, and alternatives of the procedure were discussed with the patient and informed consent was obtained, the patient was taken to the operating room.   She was positively identified, administered general anesthesia, and then placed in the dorsal lithotomy position in [address] [place] [address]. An exam under anesthesia was performed with the above findings. She was then prepped and draped in the usual fashion. A mora catheter was inserted. Given the patient's prior uterine perforation, laparoscopy was initiated prior to placement of the uterine manipulator. A 70GZ umbilical incision was then made with #15-blade and carried down to the underlying fascia. The fascia was incised and the peritoneal cavity entered via an open Benny technique. 10-mm balloon trocar was then placed and intra-peritoneal placement confirmed via direct visualization. The abdomen was then insufflated with CO2 to a pressure of 15mmHg. The abdomen was surveyed with the above findings. A speculum was placed in the vagina and the anterior lip of the cervix was grasped with a tenaculum and then dilated while watching laparoscopically. The prior site of perforation was visualized and cannulated, and then the cervical canal was cannulated into the uterine cavity. The cervix was injected with 3-cc of indigocynanine green at 3- and 9-o'clock. Then a V-care uterine manipulator was placed without difficulty. Bilateral right and left lower quadrant 5-mm trocars were then inserted under direct visualization. The patient was placed in Trendelenburg and pelvic washings were obtained. The thin adhesions of the omentum to the anterior abdominal wall were taken down with the LigaSure device. Lysis of adhesions was <15 minutes. Attention was then turned to the pelvis. Bilateral round ligaments were sealed and divided with the LigaSure device and the bilateral pelvic side-wall retroperitoneum entered and developed. Bilateral ureters were identified and preserved. Bilateral superior vesical arteries, obturator nerves, and pelvic vasculature was identified and preserved. [place] lymph node mapping identified the above sentinel lymph nodes.  The sentinel lymph nodes were identified but not removed, awaiting for frozen pathology. Bilateral infundibulopelvic ligaments were skeletonized, then sealed and divided with the LigaSure device. Dissection was continued inferiorly along the broad ligament and the bladder dissected off the lower uterine segment and cervix. Bilateral uterine arteries were skeletonized, then sealed and divided with the LigaSure device. A circumferential colpotomy was then carried along the V-care. The specimen was then removed via the vagina and sent for frozen pathology. Frozen pathology was benign so sentinel lymph nodes were not removed. The vaginal colpotomy was closed using the EndoStitch with 0-Vicryl V-lock suture. The pelvis was irrigated and made hemostatic. The intra-abdominal pressure was then decreased and all planes of dissection were confirmed hemostatic. Fibrillar was placed along all planes of dissection. The insufflation was released prior to removal of all port sites, then all trocars were removed. The umbilical fascia was reapproximated with a figure-of-8 stitch using 0-Vicryl on a UR-6 needle. All skin incisions were closed with 4-0 monocryl subcuticular stitch. Skin glue was applied to all skin incisions. The vagina was inspected with an intact vaginal cuff and no evidence of lacerations. The patient was taken out of stirrups, awakened from anesthesia, and taken to the recovery room in stable condition. The patient tolerated the procedure well. All instrument, sponge, and needle counts were correct.         Vlad Echevarria MD  5/20/2021  9:51 AM

## 2021-05-20 NOTE — ANESTHESIA PREPROCEDURE EVALUATION
Relevant Problems   CARDIOVASCULAR   (+) Essential hypertension       Anesthetic History   No history of anesthetic complications            Review of Systems / Medical History  Patient summary reviewed, nursing notes reviewed and pertinent labs reviewed    Pulmonary            Asthma : well controlled       Neuro/Psych   Within defined limits           Cardiovascular  Within defined limits  Hypertension              Exercise tolerance: >4 METS     GI/Hepatic/Renal  Within defined limits              Endo/Other  Within defined limits           Other Findings              Physical Exam    Airway  Mallampati: II  TM Distance: 4 - 6 cm  Neck ROM: normal range of motion   Mouth opening: Normal     Cardiovascular  Regular rate and rhythm,  S1 and S2 normal,  no murmur, click, rub, or gallop             Dental  No notable dental hx       Pulmonary  Breath sounds clear to auscultation               Abdominal  GI exam deferred       Other Findings            Anesthetic Plan    ASA: 2  Anesthesia type: general          Induction: Intravenous  Anesthetic plan and risks discussed with: Patient

## 2021-05-20 NOTE — ROUTINE PROCESS
Patient: Chiara Batres MRN: [id number]  SSN: [id number]   YOB: 1965  Age: 50 y.o. Sex: female     Patient is status post Procedure(s):  TOTAL LAPAROSCOPIC HYSTERECTOMY, BILATERAL SALPINGO-OOPHORECTOMY, SENTINEL LYMPH NODE MAPPING. Surgeon(s) and Role:     * Frank Wilkerson MD - Primary    Local/Dose/Irrigation:  30 ml 0.5% bupivacaine plain                  Peripheral IV 05/20/21 Anterior;Right Hand (Active)   Site Assessment Clean, dry, & intact 05/20/21 0646   Phlebitis Assessment 0 05/20/21 0646   Infiltration Assessment 0 05/20/21 0646   Dressing Status Clean, dry, & intact 05/20/21 0646   Dressing Type Transparent;Tape 05/20/21 0646   Hub Color/Line Status Pink; Infusing 05/20/21 0646            Airway - Endotracheal Tube 05/20/21 Oral (Active)                   Dressing/Packing:  Incision 05/20/21 Abdomen-Dressing/Treatment: Surgical glue (05/20/21 7736)  Incision 05/20/21 Perineum-Dressing/Treatment: [place]  (05/20/21 1944)    Splint/Cast:  ]    Other:  mora

## 2021-05-20 NOTE — DISCHARGE INSTRUCTIONS
[address] [facility] [address]  P [phone]  F [phone]     Harjinder Lo      Dear Ms. Rosalba Reilly,      Please review your instructions with your nurse and ask any questions so you have all the information you need to recover well at home. If you do not feel you have everything you need to succeed and be safe after you leave the hospital, please discuss these concerns with your nurse. As always, call for any questions at home. Your doctor: Dr. Abdi  Diagnosis: POST MENOPAUSAL BLEEDING  Procedure: Procedure(s):  TOTAL LAPAROSCOPIC HYSTERECTOMY, BILATERAL SALPINGO-OOPHORECTOMY, SENTINEL LYMPH NODE MAPPING  Date of Discharge: 5/20/2021      Take Home Medications     See Discharge Medication Review provided to you by your nurse. If you did not receive one, request this prior to your discharge. · It is important that you take your medications as they are prescribed. Your prescriptions are sent to your pharmacy on record. · Keep your medications in the bottles provided by the pharmacist and keep a list of the medication names, dosages, times to be taken and what they are for in your wallet. · Do not take other medications without consulting your doctor. · If you are prescribed enoxaparin (Lovenox) and are taking a baby aspirin daily, please hold this medication until your course of enoxaparin is completed. · You may take acetaminophen (Tylenol) and ibuprofen (Advil) for pain. If this is not sufficient for your pain, take tramadol or other pain medication you were provided. · You should take a daily gentle stool softener such as a colace pill or dulcolax suppository for constipation as this is not uncommon after surgery and/or while on pain medication. If not prescribed, this can be found over the counter. If constipation persists for >24 hours you should take a dose of Milk of Magnesia.  Call if your constipation continues. Diet    · Stay hydrated and drink fluids such as gatorade and water. This will also help prevent constipation and dehydration. Limit somewhat any usual caffeine intake of beverages such as soda, tea and coffee as this may serve to dehydrate you. · For the first 2-3 days keep a low fiber diet avoiding raw vegetables or fruits with skin. A diet consisting of soup, cereal, yogurt, eggs, fish, Boost/Ensure. Avoid fatty/greasy foods. · If nauseated, keep your diet limited to liquids and call if this persists. Activity    · If possible, have someone with you at all times until you feel stable. · Gradually increase your activity each day. There are generally no restrictions on walking, climbing stairs or riding in a car. Walk at least 4 times per day. Walking will help reduce the risk of blood clots and constipation. · Showers are okay. If you have an incision, no tub bathing/swimming for two weeks. · No driving for 2 week and/or while on pain medication. · The following restrictions apply:  1. No heavy lifting greater than 15 lbs for 4 weeks, then 25 lbs for the next 4 weeks. 2. If you had any vaginal procedure or a hysterectomy, nothing per vagina for 6-8 weeks. 3. You may experience vaginal spotting. Should the bleeding require more that 4 pads a day please call our office. Incision    · You should expect some discomfort in the area of your incision, particularly as you increase your activity. If you notice an area of increasing redness or new drainage, please call your doctor. · Your incisions will have buried, absorbable sutures, which do not need to be removed and are covered by protective glue. Please allow this to fall off on it's own and refrain from peeling it off unless it is no longer covering the incision.       Causes For Concern    If any of the following occur, please call our office and speak with the Nurse/aid who will help you with your problem or ask the doctor to call you.  Problems with the incision, including increasing pain, swelling, redness or drainage.  Inability to pass urine    Increasing abdominal pain despite medication   Persisting nausea or vomiting.  Fever or chills and a temperature >101F   Constipation (no bowel movement for three days).  Diarrhea (more than three watery stools within 24 hours).  Excessive vaginal or wound bleeding.  If after hours and you cannot reach an on-call physician, call 911. Follow-Up    Call [phone] to schedule the following appointments with Dr. Leatha Mary:   Telephone virtual-visit in 10-14 days to discuss your pathology results.  In-office visit for your postoperative exam in 6 weeks from surgery. Information obtained by :  I understand that if any problems occur once I am at home I am to contact my physician. I understand and acknowledge receipt of the instructions indicated above.                                                                                                                                            Physician's or R.N.'s Signature                                                                  Date/Time                                                                                                                                              Patient or Representative Signature                                                          Date/Time

## 2021-06-29 NOTE — PROGRESS NOTES
Post op Visit, surgery was on 5/20/2021    1. Have you been to the ER, urgent care clinic since your last visit? Hospitalized since your last visit? Yes, surgery with Dr. Wendy Crain on 5/20/2021    2. Have you seen or consulted any other health care providers outside of the [address] [place] [address] since your last visit? Include any pap smears or colon screening.    no

## 2021-06-30 ENCOUNTER — OFFICE VISIT (OUTPATIENT)
Dept: GYNECOLOGY | Age: 56
End: 2021-06-30
Payer: COMMERCIAL

## 2021-06-30 VITALS
HEART RATE: 97 BPM | WEIGHT: 173.2 LBS | DIASTOLIC BLOOD PRESSURE: 80 MMHG | BODY MASS INDEX: 27.18 KG/M2 | SYSTOLIC BLOOD PRESSURE: 118 MMHG | HEIGHT: 67 IN

## 2021-06-30 DIAGNOSIS — Z09 POSTOPERATIVE EXAMINATION: ICD-10-CM

## 2021-06-30 DIAGNOSIS — N95.0 POSTMENOPAUSAL BLEEDING: Primary | ICD-10-CM

## 2021-06-30 PROCEDURE — 99024 POSTOP FOLLOW-UP VISIT: CPT | Performed by: OBSTETRICS & GYNECOLOGY

## 2021-06-30 NOTE — PROGRESS NOTES
[address] [facility] [address]  P [phone]  F [phone]    Office Note  Patient ID:  Name:  Caron Krueger  MRN:  [id number]  :  1965/55 y.o. Date:  2021      HISTORY OF PRESENT ILLNESS:  Ms. Caron Krueger is a 50 y.o. female with a working diagnosis of PMB and cervical stenosis. On 2021 underwent Total laparoscopic hysterectomy with bilateral salpingo-oophorectomy, Bilateral sentinel lymph node mapping. Final pathology benign. Presents today for postoperative visit. Doing well without issues. Initial History:  Ms. Caron Krueger is a 50 y.o.  postmenopausal female who presents as a new patient from Dr. Kaushik Laureano for postmenopausal bleeding. The patient reports PMB that started in the  of . She reports undergoing a pelvic ultrasound in 2020 that was noted to have a normal uterus and a thickened endometrial stripe 1.2mm, along with a complex left ovarian cyst. She was noted to have a normal Ca-125 at that time. Unable to perform EMB secondary to stenotic cervical os. On 2021 underwent attempted hysteroscopy with Dr. Kaushik Laureano, which was complicated by a uterine perforation. Diagnostic laparoscopy was normal, as well as noting normal tubes/ovaries. She was subsequently referred to our office for further management. Denies pelvic or abdominal pain/bloating. Still reports some vaginal spotting, but minimal amounts. Denies fevers, chills, change in appetite or bowel habits, CP, SOB, hematuria, hematochezia, or urinary symptoms. Pertinent PMH/PSH: HTN      Active, no restrictions. Imaging Review:   n/a    ROS:  A comprehensive review of systems was negative except for that written in the History of Present Illness.  , 10 point ROS    OB/GYN ROS:  Per HPI    ECOG ndGndrndanddndend:nd nd2nd Problem List:  Patient Active Problem List    Diagnosis Date Noted    Abnormal uterine bleeding 2021    Thickened endometrium 2021    Asthma     Allergy     Acid reflux     Postmenopausal bleeding 2021    Essential hypertension 2018     PMH:  Past Medical History:   Diagnosis Date    Acid reflux     Adverse effect of anesthesia     TALKS INCESSANTLY UPON WAKING    Allergy     Asthma     STRESS INDUCED ASTHMA    Essential hypertension 2018    Psychiatric disorder     anxiety    Vertigo       PSH:  Past Surgical History:   Procedure Laterality Date    HX COLONOSCOPY      HX DILATION AND CURETTAGE      HX DILATION AND CURETTAGE  2021    HX WISDOM TEETH EXTRACTION        Social History:  Social History     Tobacco Use    Smoking status: Former Smoker     Packs/day: 1.00     Years: 5.00     Pack years: 5.00     Quit date: 2001     Years since quittin.1    Smokeless tobacco: Never Used   Substance Use Topics    Alcohol use: Yes     Comment: SOCIALLY      Family History:  Family History   Problem Relation Age of Onset    Stroke Mother     Diabetes Mother     Lupus Mother     Heart Disease Father     Other Father         HISTORY OF AFIB    Hypertension Father     Hypertension Brother     Diabetes Maternal Grandmother     Heart Disease Paternal Grandfather     Cancer Paternal Aunt         Leukemia    Diabetes Maternal Aunt     Anesth Problems Neg Hx       Medications: (reviewed)  Current Outpatient Medications   Medication Sig    medical marijuana Take 1 Each by mouth daily as needed for Pain. EDIBLE    ibuprofen (MOTRIN) 800 mg tablet Take 1 Tab by mouth every eight (8) hours as needed for Pain.  lisinopriL (PRINIVIL, ZESTRIL) 40 mg tablet Take 1 Tab by mouth daily.  sertraline (ZOLOFT) 50 mg tablet TAKE 2 TABLETS BY MOUTH DAILY (Patient taking differently: Take 50 mg by mouth daily. TAKE 2 TABLETS BY MOUTH DAILY)     No current facility-administered medications for this visit.      Allergies: (reviewed)  Allergies   Allergen Reactions    Bactrim Ds [Sulfamethoxazole-Trimethoprim] Diarrhea     mild    Erythromycin Unknown (comments)    Keflex [Cephalexin] Unknown (comments)    Sulfa (Sulfonamide Antibiotics) Rash        Gyn History:   Last pap: 7/2018, normal  History of abnormal pap: h/o ASCUS in 2004      OBJECTIVE:  Physical Exam:  Visit Vitals  /80 (BP 1 Location: Left arm, BP Patient Position: Sitting, BP Cuff Size: Adult)   Pulse 97   Ht 5' 7\" (1.702 m)   Wt 173 lb 3.2 oz (78.6 kg)   BMI 27.13 kg/m²      General: Alert and oriented. No acute distress. Well-nourished  Gastrointestinal: soft, non-tender, non-distended, no masses or organomegaly. Well-healed port-site incisions. Musculoskeletal: normal gait. No joint tenderness, deformity or swelling. No muscular tenderness. Extremities: extremities normal, atraumatic, no cyanosis or edema. Pelvic: exam chaperoned by nurse. Normal appearing external genitalia. On speculum exam, the vaginal cuff is intact and healing well. On bimanual, the uterus and cervix are surgically absent. Vaginal cuff intact without defect. No evidence of masses or nodularity on bimanual exam. Deferred rectovaginal exam.   Neuro: Grossly intact. Normal gait and movement. No acute deficit  Skin: No evidence of rashes or skin changes. IMPRESSION/PLAN:    Ms. Joe Lebron is a 50 y.o. female with a working diagnosis of PMB and cervical stenosis. On 5/20/2021 underwent Total laparoscopic hysterectomy with bilateral salpingo-oophorectomy, Bilateral sentinel lymph node mapping. Final pathology benign. Problems:     Patient Active Problem List    Diagnosis Date Noted    Abnormal uterine bleeding 05/20/2021    Thickened endometrium 05/11/2021    Asthma     Allergy     Acid reflux     Postmenopausal bleeding 04/07/2021    Essential hypertension 04/30/2018       Reviewed patient's course to date, including her benign surgical pathology. She is healing well from surgery.  Given her benign pathology, the patient will be discharged from Gynecologic Oncology clinic. Encouraged patient to follow-up with her PCP for continued routine health care maintenance. All questions and concerns were addressed with the patient and she is comfortable with the plan. An electronic signature was used to authenticate this note.      Lillian Vera MD

## (undated) DEVICE — TOTAL TRAY, DB, 100% SILI FOLEY, 16FR 10: Brand: MEDLINE

## (undated) DEVICE — STERILE POLYISOPRENE POWDER-FREE SURGICAL GLOVES WITH EMOLLIENT COATING: Brand: PROTEXIS

## (undated) DEVICE — STRAP,POSITIONING,KNEE/BODY,FOAM,4X60": Brand: MEDLINE

## (undated) DEVICE — COVER LT HNDL PLAS RIG 1 PER PK

## (undated) DEVICE — STERILE NEOPRENE POWDER-FREE SURGICAL GLOVES WITH NITRILE COATING: Brand: PROTEXIS

## (undated) DEVICE — PAD,SANITARY,11 IN,MAXI,N-STRL,IND WRAP: Brand: MEDLINE

## (undated) DEVICE — DERMABOND SKIN ADH 0.7ML -- DERMABOND ADVANCED 12/BX

## (undated) DEVICE — SYSTEM EVAC SMOKE LAPARSCOPIC

## (undated) DEVICE — GARMENT,MEDLINE,DVT,INT,CALF,MED, GEN2: Brand: MEDLINE

## (undated) DEVICE — Device

## (undated) DEVICE — TROCAR ENDOSCP L100MM DIA5MM BLDELSS STBL SL OBT RADLUC

## (undated) DEVICE — SUTURING DEVICE: Brand: ENDO STITCH

## (undated) DEVICE — PREP SKN CHLRAPRP APL 26ML STR --

## (undated) DEVICE — DRAPE,REIN 53X77,STERILE: Brand: MEDLINE

## (undated) DEVICE — MARKER,SKIN,WI/RULER AND LABELS: Brand: MEDLINE

## (undated) DEVICE — INFECTION CONTROL KIT SYS

## (undated) DEVICE — CURETTE SUCT DIA3MM ENDOMET O RNG DBL END

## (undated) DEVICE — SET SEALS HYSTEROSCOPE DISP -- MYOSURE  EA=10

## (undated) DEVICE — SUTURE MCRYL SZ 4-0 L27IN ABSRB UD L19MM PS-2 1/2 CIR PRIM Y426H

## (undated) DEVICE — DEVICE RELD SZ 0 L8IN GRN ABSRB FOR ENDO SILS STIT DEVS

## (undated) DEVICE — SURGICAL PROCEDURE PACK GYN LAPAROSCOPY CUST SMH LF

## (undated) DEVICE — HANDLE LT SNAP ON ULT DURABLE LENS FOR TRUMPF ALC DISPOSABLE

## (undated) DEVICE — SHEET,DRAPE,UNDERBUTTOCK,GRAD POUCH,PORT: Brand: MEDLINE

## (undated) DEVICE — TRAY PREP DRY W/ PREM GLV 2 APPL 6 SPNG 2 UNDPD 1 OVERWRAP

## (undated) DEVICE — GOWN,SIRUS,NONRNF,SETINSLV,XL,20/CS: Brand: MEDLINE

## (undated) DEVICE — LAPAROSCOPIC TROCAR SLEEVE/SINGLE USE: Brand: KII® OPTICAL ACCESS SYSTEM

## (undated) DEVICE — FLUID MGMT SYS FLUENT KIT 6/PK

## (undated) DEVICE — VCARE MEDIUM, UTERINE MANIPULATOR, VAGINAL-CERVICAL-AHLUWALIA'S-RETRACTOR-ELEVATOR: Brand: VCARE

## (undated) DEVICE — PAD PT POS 36 IN SURGYPAD DISP

## (undated) DEVICE — BLADE ASSEMB CLP HAIR FINE --

## (undated) DEVICE — SPONGE GZ W4XL4IN COT RADPQ HIGHLY ABSRB

## (undated) DEVICE — COVER,TABLE,60X90,STERILE: Brand: MEDLINE

## (undated) DEVICE — INTENDED FOR TISSUE SEPARATION, AND OTHER PROCEDURES THAT REQUIRE A SHARP SURGICAL BLADE TO PUNCTURE OR CUT.: Brand: BARD-PARKER ® CARBON RIB-BACK BLADES

## (undated) DEVICE — REM POLYHESIVE ADULT PATIENT RETURN ELECTRODE: Brand: VALLEYLAB

## (undated) DEVICE — SUTURE SZ 0 27IN 5/8 CIR UR-6  TAPER PT VIOLET ABSRB VICRYL J603H

## (undated) DEVICE — TROCAR ENDOSCP L100MM DIA5MM BLDELSS STBL SL THRD OPT VW

## (undated) DEVICE — SYR 10ML LUER LOK 1/5ML GRAD --

## (undated) DEVICE — VISUALIZATION SYSTEM: Brand: CLEARIFY

## (undated) DEVICE — SOL IRR SOD CL 0.9% 3000ML --

## (undated) DEVICE — SYR 50ML LR LCK 1ML GRAD NSAF --

## (undated) DEVICE — SCISSORS ENDOSCP DIA5MM CRV MPLR CAUT W/ RATCH HNDL

## (undated) DEVICE — BASIC SINGLE BASIN BTC-LF: Brand: MEDLINE INDUSTRIES, INC.

## (undated) DEVICE — NEEDLE HYPO 22GA L1.5IN BLK S STL HUB POLYPR SHLD REG BVL

## (undated) DEVICE — SOL IRR SOD CL 0.9% 1000ML BTL --

## (undated) DEVICE — DRAPE,LITHOTOMY,STERILE: Brand: MEDLINE

## (undated) DEVICE — AGENT HEMSTAT W4XL4IN OXIDIZED REGENERATED CELOS ABSRB SFT

## (undated) DEVICE — SEALER ENDOSCP L37CM NANO COAT BLNT TIP LAP DIV

## (undated) DEVICE — CATHETER,URETHRAL,REDRUBBER,STRL,16FR: Brand: MEDLINE

## (undated) DEVICE — TROCAR: Brand: KII® SLEEVE

## (undated) DEVICE — TOWEL SURG W17XL27IN STD BLU COT NONFENESTRATED PREWASHED

## (undated) DEVICE — TROCARS: Brand: KII® BALLOON BLUNT TIP SYSTEM